# Patient Record
Sex: FEMALE | Race: WHITE | Employment: OTHER | URBAN - METROPOLITAN AREA
[De-identification: names, ages, dates, MRNs, and addresses within clinical notes are randomized per-mention and may not be internally consistent; named-entity substitution may affect disease eponyms.]

---

## 2017-08-16 ENCOUNTER — HOSPITAL ENCOUNTER (OUTPATIENT)
Age: 47
Setting detail: OBSERVATION
Discharge: HOME | DRG: 305 | End: 2017-08-18
Attending: EMERGENCY MEDICINE | Admitting: FAMILY MEDICINE
Payer: COMMERCIAL

## 2017-08-16 ENCOUNTER — APPOINTMENT (OUTPATIENT)
Dept: CT IMAGING | Age: 47
DRG: 305 | End: 2017-08-16
Attending: NURSE PRACTITIONER
Payer: COMMERCIAL

## 2017-08-16 DIAGNOSIS — R20.0 NUMBNESS OF TONGUE: ICD-10-CM

## 2017-08-16 PROBLEM — R53.1 WEAKNESS: Status: ACTIVE | Noted: 2017-08-16

## 2017-08-16 LAB
ALBUMIN SERPL-MCNC: 4.3 G/DL (ref 3.5–5)
ALBUMIN/GLOB SERPL: 1.3 {RATIO} (ref 1.1–2.2)
ALP SERPL-CCNC: 86 U/L (ref 45–117)
ALT SERPL-CCNC: 49 U/L (ref 12–78)
ANION GAP SERPL CALC-SCNC: 7 MMOL/L (ref 5–15)
AST SERPL-CCNC: 20 U/L (ref 15–37)
ATRIAL RATE: 121 BPM
BASOPHILS # BLD: 0 K/UL (ref 0–0.1)
BASOPHILS NFR BLD: 0 % (ref 0–1)
BILIRUB DIRECT SERPL-MCNC: 0.1 MG/DL (ref 0–0.2)
BILIRUB SERPL-MCNC: 0.6 MG/DL (ref 0.2–1)
BUN SERPL-MCNC: 11 MG/DL (ref 6–20)
BUN/CREAT SERPL: 16 (ref 12–20)
CALCIUM SERPL-MCNC: 9 MG/DL (ref 8.5–10.1)
CALCULATED P AXIS, ECG09: 35 DEGREES
CALCULATED R AXIS, ECG10: 61 DEGREES
CALCULATED T AXIS, ECG11: -46 DEGREES
CHLORIDE SERPL-SCNC: 96 MMOL/L (ref 97–108)
CK SERPL-CCNC: 111 U/L (ref 26–192)
CO2 SERPL-SCNC: 32 MMOL/L (ref 21–32)
CREAT SERPL-MCNC: 0.7 MG/DL (ref 0.55–1.02)
D DIMER PPP FEU-MCNC: <0.17 MG/L FEU (ref 0–0.65)
DIAGNOSIS, 93000: NORMAL
EOSINOPHIL # BLD: 0.1 K/UL (ref 0–0.4)
EOSINOPHIL NFR BLD: 2 % (ref 0–7)
ERYTHROCYTE [DISTWIDTH] IN BLOOD BY AUTOMATED COUNT: 12.3 % (ref 11.5–14.5)
FOLATE SERPL-MCNC: 24.8 NG/ML (ref 5–21)
GLOBULIN SER CALC-MCNC: 3.2 G/DL (ref 2–4)
GLUCOSE SERPL-MCNC: 135 MG/DL (ref 65–100)
HCG UR QL: NEGATIVE
HCT VFR BLD AUTO: 41.6 % (ref 35–47)
HGB BLD-MCNC: 14.1 G/DL (ref 11.5–16)
INR PPP: 1.1 (ref 0.9–1.1)
LYMPHOCYTES # BLD: 2 K/UL (ref 0.8–3.5)
LYMPHOCYTES NFR BLD: 27 % (ref 12–49)
MAGNESIUM SERPL-MCNC: 2.2 MG/DL (ref 1.6–2.4)
MCH RBC QN AUTO: 31.4 PG (ref 26–34)
MCHC RBC AUTO-ENTMCNC: 33.9 G/DL (ref 30–36.5)
MCV RBC AUTO: 92.7 FL (ref 80–99)
MONOCYTES # BLD: 0.6 K/UL (ref 0–1)
MONOCYTES NFR BLD: 8 % (ref 5–13)
NEUTS SEG # BLD: 4.8 K/UL (ref 1.8–8)
NEUTS SEG NFR BLD: 63 % (ref 32–75)
P-R INTERVAL, ECG05: 150 MS
PLATELET # BLD AUTO: 210 K/UL (ref 150–400)
POTASSIUM SERPL-SCNC: 3.1 MMOL/L (ref 3.5–5.1)
PROT SERPL-MCNC: 7.5 G/DL (ref 6.4–8.2)
PROTHROMBIN TIME: 11.2 SEC (ref 9–11.1)
Q-T INTERVAL, ECG07: 336 MS
QRS DURATION, ECG06: 88 MS
QTC CALCULATION (BEZET), ECG08: 477 MS
RBC # BLD AUTO: 4.49 M/UL (ref 3.8–5.2)
SODIUM SERPL-SCNC: 135 MMOL/L (ref 136–145)
TROPONIN I SERPL-MCNC: <0.04 NG/ML
TROPONIN I SERPL-MCNC: <0.04 NG/ML
TSH SERPL DL<=0.05 MIU/L-ACNC: 1.01 UIU/ML (ref 0.36–3.74)
VENTRICULAR RATE, ECG03: 121 BPM
VIT B12 SERPL-MCNC: 708 PG/ML (ref 211–911)
WBC # BLD AUTO: 7.6 K/UL (ref 3.6–11)

## 2017-08-16 PROCEDURE — 36415 COLL VENOUS BLD VENIPUNCTURE: CPT | Performed by: NURSE PRACTITIONER

## 2017-08-16 PROCEDURE — 85610 PROTHROMBIN TIME: CPT | Performed by: NURSE PRACTITIONER

## 2017-08-16 PROCEDURE — 82746 ASSAY OF FOLIC ACID SERUM: CPT | Performed by: FAMILY MEDICINE

## 2017-08-16 PROCEDURE — 83735 ASSAY OF MAGNESIUM: CPT | Performed by: NURSE PRACTITIONER

## 2017-08-16 PROCEDURE — 82550 ASSAY OF CK (CPK): CPT | Performed by: NURSE PRACTITIONER

## 2017-08-16 PROCEDURE — 96375 TX/PRO/DX INJ NEW DRUG ADDON: CPT

## 2017-08-16 PROCEDURE — 81025 URINE PREGNANCY TEST: CPT

## 2017-08-16 PROCEDURE — 84443 ASSAY THYROID STIM HORMONE: CPT | Performed by: NURSE PRACTITIONER

## 2017-08-16 PROCEDURE — 96361 HYDRATE IV INFUSION ADD-ON: CPT

## 2017-08-16 PROCEDURE — 80048 BASIC METABOLIC PNL TOTAL CA: CPT | Performed by: NURSE PRACTITIONER

## 2017-08-16 PROCEDURE — 85379 FIBRIN DEGRADATION QUANT: CPT | Performed by: NURSE PRACTITIONER

## 2017-08-16 PROCEDURE — 74011250637 HC RX REV CODE- 250/637: Performed by: NURSE PRACTITIONER

## 2017-08-16 PROCEDURE — 74011250636 HC RX REV CODE- 250/636: Performed by: NURSE PRACTITIONER

## 2017-08-16 PROCEDURE — 84484 ASSAY OF TROPONIN QUANT: CPT | Performed by: NURSE PRACTITIONER

## 2017-08-16 PROCEDURE — 82607 VITAMIN B-12: CPT | Performed by: FAMILY MEDICINE

## 2017-08-16 PROCEDURE — 99218 HC RM OBSERVATION: CPT

## 2017-08-16 PROCEDURE — 72125 CT NECK SPINE W/O DYE: CPT

## 2017-08-16 PROCEDURE — 65660000000 HC RM CCU STEPDOWN

## 2017-08-16 PROCEDURE — 74011250636 HC RX REV CODE- 250/636: Performed by: FAMILY MEDICINE

## 2017-08-16 PROCEDURE — 99285 EMERGENCY DEPT VISIT HI MDM: CPT

## 2017-08-16 PROCEDURE — 96374 THER/PROPH/DIAG INJ IV PUSH: CPT

## 2017-08-16 PROCEDURE — 85025 COMPLETE CBC W/AUTO DIFF WBC: CPT | Performed by: NURSE PRACTITIONER

## 2017-08-16 PROCEDURE — 96376 TX/PRO/DX INJ SAME DRUG ADON: CPT

## 2017-08-16 PROCEDURE — 71275 CT ANGIOGRAPHY CHEST: CPT

## 2017-08-16 PROCEDURE — 93005 ELECTROCARDIOGRAM TRACING: CPT

## 2017-08-16 PROCEDURE — 74011250637 HC RX REV CODE- 250/637: Performed by: FAMILY MEDICINE

## 2017-08-16 PROCEDURE — 74011636320 HC RX REV CODE- 636/320: Performed by: EMERGENCY MEDICINE

## 2017-08-16 PROCEDURE — 80076 HEPATIC FUNCTION PANEL: CPT | Performed by: NURSE PRACTITIONER

## 2017-08-16 PROCEDURE — 70450 CT HEAD/BRAIN W/O DYE: CPT

## 2017-08-16 PROCEDURE — 74011000258 HC RX REV CODE- 258: Performed by: EMERGENCY MEDICINE

## 2017-08-16 RX ORDER — DIAZEPAM 10 MG/2ML
5 INJECTION INTRAMUSCULAR EVERY 8 HOURS
Status: DISCONTINUED | OUTPATIENT
Start: 2017-08-16 | End: 2017-08-17

## 2017-08-16 RX ORDER — METHOCARBAMOL 750 MG/1
TABLET, FILM COATED ORAL 4 TIMES DAILY
COMMUNITY
End: 2017-08-16

## 2017-08-16 RX ORDER — ACETAMINOPHEN 325 MG/1
650 TABLET ORAL
Status: DISCONTINUED | OUTPATIENT
Start: 2017-08-16 | End: 2017-08-18 | Stop reason: HOSPADM

## 2017-08-16 RX ORDER — METHOCARBAMOL 500 MG/1
500 TABLET, FILM COATED ORAL 4 TIMES DAILY
COMMUNITY

## 2017-08-16 RX ORDER — ONDANSETRON 2 MG/ML
4 INJECTION INTRAMUSCULAR; INTRAVENOUS
Status: COMPLETED | OUTPATIENT
Start: 2017-08-16 | End: 2017-08-16

## 2017-08-16 RX ORDER — TRAMADOL HYDROCHLORIDE 50 MG/1
50 TABLET ORAL
COMMUNITY

## 2017-08-16 RX ORDER — OXYCODONE AND ACETAMINOPHEN 5; 325 MG/1; MG/1
1 TABLET ORAL
Status: DISCONTINUED | OUTPATIENT
Start: 2017-08-16 | End: 2017-08-18 | Stop reason: HOSPADM

## 2017-08-16 RX ORDER — DIAZEPAM 10 MG/2ML
5 INJECTION INTRAMUSCULAR ONCE
Status: COMPLETED | OUTPATIENT
Start: 2017-08-16 | End: 2017-08-16

## 2017-08-16 RX ORDER — HYDRALAZINE HYDROCHLORIDE 20 MG/ML
10 INJECTION INTRAMUSCULAR; INTRAVENOUS
Status: DISCONTINUED | OUTPATIENT
Start: 2017-08-16 | End: 2017-08-18

## 2017-08-16 RX ORDER — SODIUM CHLORIDE 0.9 % (FLUSH) 0.9 %
10 SYRINGE (ML) INJECTION
Status: COMPLETED | OUTPATIENT
Start: 2017-08-16 | End: 2017-08-16

## 2017-08-16 RX ORDER — SULFASALAZINE 500 MG/1
500 TABLET ORAL 4 TIMES DAILY
COMMUNITY

## 2017-08-16 RX ORDER — POTASSIUM CHLORIDE 750 MG/1
40 TABLET, FILM COATED, EXTENDED RELEASE ORAL
Status: COMPLETED | OUTPATIENT
Start: 2017-08-16 | End: 2017-08-16

## 2017-08-16 RX ORDER — MELATONIN
1000 DAILY
COMMUNITY

## 2017-08-16 RX ORDER — PRAVASTATIN SODIUM 40 MG/1
40 TABLET ORAL
Status: DISCONTINUED | OUTPATIENT
Start: 2017-08-16 | End: 2017-08-18 | Stop reason: HOSPADM

## 2017-08-16 RX ORDER — DICLOFENAC SODIUM 75 MG/1
75 TABLET, DELAYED RELEASE ORAL 2 TIMES DAILY
COMMUNITY

## 2017-08-16 RX ORDER — SODIUM CHLORIDE 9 MG/ML
100 INJECTION, SOLUTION INTRAVENOUS CONTINUOUS
Status: DISCONTINUED | OUTPATIENT
Start: 2017-08-16 | End: 2017-08-17

## 2017-08-16 RX ORDER — GUAIFENESIN 100 MG/5ML
81 LIQUID (ML) ORAL DAILY
Status: DISCONTINUED | OUTPATIENT
Start: 2017-08-17 | End: 2017-08-18 | Stop reason: HOSPADM

## 2017-08-16 RX ORDER — FAMOTIDINE 20 MG/1
20 TABLET, FILM COATED ORAL EVERY 12 HOURS
Status: DISCONTINUED | OUTPATIENT
Start: 2017-08-16 | End: 2017-08-18 | Stop reason: HOSPADM

## 2017-08-16 RX ORDER — SODIUM CHLORIDE 9 MG/ML
100 INJECTION, SOLUTION INTRAVENOUS ONCE
Status: COMPLETED | OUTPATIENT
Start: 2017-08-16 | End: 2017-08-16

## 2017-08-16 RX ORDER — SODIUM CHLORIDE 0.9 % (FLUSH) 0.9 %
5-10 SYRINGE (ML) INJECTION AS NEEDED
Status: DISCONTINUED | OUTPATIENT
Start: 2017-08-16 | End: 2017-08-18 | Stop reason: HOSPADM

## 2017-08-16 RX ORDER — ACETAMINOPHEN 650 MG/1
650 SUPPOSITORY RECTAL
Status: DISCONTINUED | OUTPATIENT
Start: 2017-08-16 | End: 2017-08-18 | Stop reason: HOSPADM

## 2017-08-16 RX ORDER — ONDANSETRON 2 MG/ML
4 INJECTION INTRAMUSCULAR; INTRAVENOUS
Status: DISCONTINUED | OUTPATIENT
Start: 2017-08-16 | End: 2017-08-18 | Stop reason: HOSPADM

## 2017-08-16 RX ORDER — ACETAMINOPHEN 500 MG
500 TABLET ORAL
COMMUNITY

## 2017-08-16 RX ORDER — SODIUM CHLORIDE 0.9 % (FLUSH) 0.9 %
5-10 SYRINGE (ML) INJECTION EVERY 8 HOURS
Status: DISCONTINUED | OUTPATIENT
Start: 2017-08-16 | End: 2017-08-18 | Stop reason: HOSPADM

## 2017-08-16 RX ORDER — LORAZEPAM 2 MG/ML
1 INJECTION INTRAMUSCULAR ONCE
Status: COMPLETED | OUTPATIENT
Start: 2017-08-16 | End: 2017-08-16

## 2017-08-16 RX ADMIN — ONDANSETRON 4 MG: 2 INJECTION INTRAMUSCULAR; INTRAVENOUS at 14:16

## 2017-08-16 RX ADMIN — PRAVASTATIN SODIUM 40 MG: 40 TABLET ORAL at 23:09

## 2017-08-16 RX ADMIN — ONDANSETRON 4 MG: 2 INJECTION INTRAMUSCULAR; INTRAVENOUS at 19:01

## 2017-08-16 RX ADMIN — DIAZEPAM 5 MG: 5 INJECTION, SOLUTION INTRAMUSCULAR; INTRAVENOUS at 11:41

## 2017-08-16 RX ADMIN — SODIUM CHLORIDE 100 ML/HR: 900 INJECTION, SOLUTION INTRAVENOUS at 17:07

## 2017-08-16 RX ADMIN — SODIUM CHLORIDE 100 ML/HR: 900 INJECTION, SOLUTION INTRAVENOUS at 23:12

## 2017-08-16 RX ADMIN — FAMOTIDINE 20 MG: 20 TABLET ORAL at 23:09

## 2017-08-16 RX ADMIN — Medication 10 ML: at 13:10

## 2017-08-16 RX ADMIN — ONDANSETRON 4 MG: 2 INJECTION INTRAMUSCULAR; INTRAVENOUS at 11:17

## 2017-08-16 RX ADMIN — DIAZEPAM 5 MG: 5 INJECTION, SOLUTION INTRAMUSCULAR; INTRAVENOUS at 14:16

## 2017-08-16 RX ADMIN — LORAZEPAM 1 MG: 2 INJECTION INTRAMUSCULAR; INTRAVENOUS at 11:17

## 2017-08-16 RX ADMIN — DIAZEPAM 5 MG: 5 INJECTION, SOLUTION INTRAMUSCULAR; INTRAVENOUS at 23:05

## 2017-08-16 RX ADMIN — POTASSIUM CHLORIDE 40 MEQ: 750 TABLET, FILM COATED, EXTENDED RELEASE ORAL at 12:26

## 2017-08-16 RX ADMIN — Medication 10 ML: at 22:00

## 2017-08-16 RX ADMIN — ACETAMINOPHEN 650 MG: 325 TABLET, FILM COATED ORAL at 17:06

## 2017-08-16 RX ADMIN — IOPAMIDOL 70 ML: 755 INJECTION, SOLUTION INTRAVENOUS at 13:10

## 2017-08-16 RX ADMIN — SODIUM CHLORIDE 100 ML: 900 INJECTION, SOLUTION INTRAVENOUS at 13:10

## 2017-08-16 RX ADMIN — OXYCODONE HYDROCHLORIDE AND ACETAMINOPHEN 1 TABLET: 5; 325 TABLET ORAL at 19:01

## 2017-08-16 RX ADMIN — HYDRALAZINE HYDROCHLORIDE 10 MG: 20 INJECTION INTRAMUSCULAR; INTRAVENOUS at 19:07

## 2017-08-16 RX ADMIN — SODIUM CHLORIDE 100 ML/HR: 900 INJECTION, SOLUTION INTRAVENOUS at 11:41

## 2017-08-16 RX ADMIN — Medication 10 ML: at 23:09

## 2017-08-16 NOTE — ED NOTES
Patient given warm blanket, resting on stretcher with call bell within reach. No further need at this time.

## 2017-08-16 NOTE — ED TRIAGE NOTES
Pt. complains of headache and neck pain and elevated BP. Due to have MRI on Friday. Also states \"I can't focus\".

## 2017-08-16 NOTE — H&P
[address] [facility] [place] [address]   HISTORY AND PHYSICAL       Name:  Edward Jenkins   MR#:  [id number]   :  1970   Account #:  [de-identified]        Date of Adm:  2017       CHIEF COMPLAINT: Tachycardia and tongue numbness. HISTORY OF PRESENT ILLNESS: The patient is a 79-year-old   female with past medical history of chronic neck pain, history of   degenerative disk disease, and anxiety, who presents to the hospital   with the above mentioned symptoms. The patient reports that her   symptoms started about a week back. The patient reports that she has   history of chronic neck pain, has had multiple evaluations for her   degenerative disk disease and is scheduled to have an MRI in the near   future. The patient also reports that she gets exacerbation of her neck   pain off and on. For the past 1-2 weeks, she has been noticing some   increased pain in her neck. The patient reports it is more muscular in   nature, especially when she moves her neck from side to side. The   patient also reports that she has history of psoriatic arthritis for which   she has been seeing her rheumatologist. The patient reports that about   a week back, she started experiencing more pain and discomfort,   started taking some tramadol, which was prescribed to her. She also   started taking medications containing black cohosh, which was over-  the-counter for symptoms related to hot flashes. The patient reports   that about a week back, she started noticing some numbness in her   tongue bilaterally which got her concerned. She also noticed that she   has some numbness and tingling in her bilateral upper extremities. She   attributed it to radiculopathy from her cervical disk disease. The patient   reports that this morning she woke up and she felt tachycardic.  She   took her blood pressure and it was elevated, got concerned and   decided to come to the hospital. The patient also reports that she has   been traveling a lot this summer. She denies any chest pain or any   shortness of breath or cough associated with her symptoms. The   patient reports that she also had some sense of impending doom as if   she was going to have \"something really, really bad happen to her. \"   The patient denies any other complaints or problems. In the ER, the   patient was found to be hypertensive, tachycardic, with preliminary   diagnosis of serotonin syndrome is made and the hospitalist service   was requested to admit the patient. The patient reports that she thinks   she has had photophobia in the past and had some trouble with this   and has had some trouble with noise in the past, but not having the   symptoms today. The patient reports that she has been getting a   headache and initially she thought this was secondary to atypical   migraine. The patient reports that currently she has a headache, but no   photophobia and also denies any blurry vision, sore throat, trouble   swallowing, trouble with speech. Denies any chest pain, cough, fever,   chills, abdominal pain, constipation, diarrhea, urinary symptoms, focal   or generalized neurological weakness, besides the symptoms   mentioned above, recent sick contacts, falls, injuries, hematemesis,   melena, hemoptysis, any insect or tick bites that are known to her, any   rashes or any other concerns or problems. PAST MEDICAL HISTORY: See above. HOME MEDICATIONS: Currently the patient is on:   1. Robaxin 500 mg 4 times daily. 2. Azulfidine 500 mg 4 times a day. 3. Black cohosh menopause complex p.o.   4. Voltaren as needed. 5. Tramadol 50 mg every 6 hours as needed. 6. Tylenol as needed. SOCIAL HISTORY: Denies tobacco abuse, alcohol use, or IV drug   abuse. Lives at home. REVIEW OF SYSTEMS: Were reviewed and were found to be   essentially negative except for the symptoms mentioned above. ALLERGIES: NO KNOWN DRUG ALLERGIES.     PHYSICAL EXAMINATION   VITAL SIGNS: Temperature 98, pulse 106, respiratory rate 20, blood   pressure 169/104. On arrival to the ER, blood pressure was 190/100   and pulse rate was 133, pulse oximetry was 96% on room air. GENERAL: Alert and oriented x3, awake, mildly distressed and   anxious female, appears to be stated age. HEENT: Pupils equal and reactive to light. Dry mucous membranes. Tympanic membranes clear. NECK: Supple. CHEST: Clear to auscultation bilaterally. CORONARY: S1, S2 were heard. ABDOMEN: Soft, nontender, nondistended. Bowel sounds are   physiological.   EXTREMITIES: No clubbing, no cyanosis, no edema. NEUROPSYCH: Pleasant mood and affect. Cranial nerves 2-12   grossly intact. Sensory grossly within normal limits. DTRs 2+/3-4. No   hyperreflexia was noted. No clonus was noted. Strength 5/5 into 4. SKIN: Warm. LABORATORY DATA: White count 7.6, hemoglobin 14.1, hematocrit   41.6, platelets 321. INR 1.1. Sodium 135, potassium 3.1, chloride 96,   bicarbonate 32, glucose 135, BUN 11, creatinine 0.7, calcium 9.2,   magnesium 2.2, bilirubin total 0.6, albumin 4.3, ALT 49, AST 20,   alkaline phosphatase 86. , troponin less than 0.04. TSH 1.01. INR 1.1. CT of the head without contrast shows no acute abnormality. CT of the   neck shows DJD at multiple levels and anterolisthesis of C4 on C5 and   abscess of right submandibular gland. See the full reports for details. CT of the chest shows no evidence of PE.     EKG initial showed some T-wave inversion in the lateral leads. Repeat   EKG shows resolution of those changes. ASSESSMENT AND PLAN:   1. Tongue numbness, unclear etiology, associated with headache, rule   out cerebrovascular accident. I spoke with the neurologist, Dr. Gibran Hui. Recommends MRI, MRA of the brain, which have been   ordered. Aspirin, statin, neurovascular checks. PT, OT, speech   consult. Hemoglobin A1c and cardiac enzymes.  Close monitoring and   further intervention will be per hospital course. Reassess as needed. 2. Hypertension urgency with tachycardia. Differential includes anxiety   versus serotonin surge versus other etiology. The patient does not   have any clonus or hyperreflexia and no hyperthermia, which goes   against patient being in serotonin syndrome. Start the patient on IV   hydration and Valium. We will get an echocardiogram. We will get   troponin in light of EKG changes. We will start the patient on aspirin,   statin, get cardiology consult and further intervention will be per   hospital course. The patient will be monitored on telemetry and further   intervention will be per the hospital course. 3. Persistent neck pain, most likely secondary to degenerative disk   disease. I spoke with Neurology at this point of time. There is no acute   need to get a lumbar puncture. We will provide pain control and   continue to closely monitor. 4. Hypokalemia. Replace potassium. 5. Deep venous thrombosis prophylaxis. The patient will be on   sequential compression devices.         Chela Tirado MD MM / CARIDAD   D:  08/16/2017   15:50   T:  08/16/2017   16:57   Job #:  [id number]

## 2017-08-16 NOTE — IP AVS SNAPSHOT
[address] [facility] [address] [place] [address] 
[phone] Patient: Lord Agosto MRN: [id number] [facility] Current Discharge Medication List  
  
START taking these medications Dose & Instructions Dispensing Information Comments Morning Noon Evening Bedtime  
 hydrALAZINE 10 mg tablet Commonly known as:  APRESOLINE Your last dose was: Your next dose is:    
   
   
 Dose:  10 mg Take 1 Tab by mouth four (4) times daily as needed for Other (BP>150/100). Quantity:  30 Tab Refills:  1 CONTINUE these medications which have NOT CHANGED Dose & Instructions Dispensing Information Comments Morning Noon Evening Bedtime  
 diclofenac EC 75 mg EC tablet Commonly known as:  VOLTAREN Your last dose was: Your next dose is:    
   
   
 Dose:  75 mg Take 75 mg by mouth two (2) times a day. Refills:  0  
     
   
   
   
  
 ROBAXIN 500 mg tablet Generic drug:  methocarbamol Your last dose was: Your next dose is:    
   
   
 Dose:  500 mg Take 500 mg by mouth four (4) times daily. Refills:  0  
     
   
   
   
  
 sulfaSALAzine 500 mg tablet Commonly known as:  AZULFIDINE Your last dose was: Your next dose is:    
   
   
 Dose:  500 mg Take 500 mg by mouth four (4) times daily. Refills:  0  
     
   
   
   
  
 traMADol 50 mg tablet Commonly known as:  ULTRAM  
   
Your last dose was: Your next dose is:    
   
   
 Dose:  50 mg Take 50 mg by mouth every six (6) hours as needed for Pain. Refills:  0  
     
   
   
   
  
 TYLENOL EXTRA STRENGTH 500 mg tablet Generic drug:  acetaminophen Your last dose was: Your next dose is:    
   
   
 Dose:  500 mg Take 500 mg by mouth every six (6) hours as needed for Pain. Refills:  0  
     
   
   
   
  
 VITAMIN D3 1,000 unit tablet Generic drug:  cholecalciferol Your last dose was: Your next dose is:    
   
   
 Dose:  1000 Units Take 1,000 Units by mouth daily. Refills:  0 STOP taking these medications BLACK COHOSH MENOPAUSE COMPLEX PO Where to Get Your Medications Information on where to get these meds will be given to you by the nurse or doctor. ! Ask your nurse or doctor about these medications  
  hydrALAZINE 10 mg tablet

## 2017-08-16 NOTE — PROGRESS NOTES
Admission Medication Reconciliation:    Information obtained from: patient    Significant PMH/Disease States:   Past Medical History:   Diagnosis Date    Arthritis        Chief Complaint for this Admission:    Chief Complaint   Patient presents with    Headache    Neck Pain         Allergies:  Review of patient's allergies indicates no known allergies. Prior to Admission Medications:   Prior to Admission Medications   Prescriptions Last Dose Informant Patient Reported? Taking? BL COH/TEA/GINSG/DIANE/HOP/THEAN (BLACK COHOSH MENOPAUSE COMPLEX PO)   Yes Yes   Sig: Take 1 Tab by mouth daily. Patient takes estropause which contains calcium, magnesium and black cohosh   acetaminophen (TYLENOL EXTRA STRENGTH) 500 mg tablet   Yes Yes   Sig: Take 500 mg by mouth every six (6) hours as needed for Pain. cholecalciferol (VITAMIN D3) 1,000 unit tablet   Yes Yes   Sig: Take 1,000 Units by mouth daily. diclofenac EC (VOLTAREN) 75 mg EC tablet   Yes Yes   Sig: Take 75 mg by mouth two (2) times a day. methocarbamol (ROBAXIN) 500 mg tablet   Yes Yes   Sig: Take 500 mg by mouth four (4) times daily. sulfaSALAzine (AZULFIDINE) 500 mg tablet   Yes Yes   Sig: Take 500 mg by mouth four (4) times daily. traMADol (ULTRAM) 50 mg tablet   Yes Yes   Sig: Take 50 mg by mouth every six (6) hours as needed for Pain. Facility-Administered Medications: None         Comments/Recommendations: This medication history was obtained from patient; (s)he appears to be a good historian. An RX Query is not available. Medications added: all listed above    Last doses of medication: patient took tylenol around 0200 today  Review of Allergies: not completed    Also of note: Patient also takes stelera injections for storiatic arthritis. Patient just completed a prednisone taper yesterday. Med rec was completed at request of md to identify potential interactions which could lead to serotonin syndrome.  Suggested this IS a possibility as a result of tramadol + estropause (which contains black cohosh, soy isoflavins, Andre's wort). Thank you for allowing me to participate in the care of this patient. Please contact the pharmacy () or the medication reconciliation pharmacy () with any questions.     [place], BhargavD

## 2017-08-16 NOTE — ED PROVIDER NOTES
activity: Not on file     Other Topics Concern    Not on file     Social History Narrative    No narrative on file         ALLERGIES: Review of patient's allergies indicates no known allergies. Review of Systems   Constitutional: Positive for fatigue. Negative for activity change, appetite change, chills, diaphoresis and fever. HENT: Positive for voice change (patient states her voice is more scratchy then normal.). Negative for congestion, ear pain, sinus pressure, sneezing, sore throat and trouble swallowing. Eyes: Negative for photophobia, pain, redness and visual disturbance. Respiratory: Negative for cough, chest tightness, shortness of breath and wheezing. Cardiovascular: Negative for chest pain, palpitations and leg swelling. Gastrointestinal: Positive for nausea. Negative for abdominal distention, abdominal pain and vomiting. Endocrine: Negative. Genitourinary: Negative for difficulty urinating, flank pain, frequency and urgency. Musculoskeletal: Positive for arthralgias, myalgias and neck pain (no pain on palpation to vertebral body but muscle pain endorsed). Negative for back pain, gait problem, joint swelling and neck stiffness. Skin: Negative for color change, pallor, rash and wound. psoriasis   Allergic/Immunologic: Negative. Neurological: Positive for numbness (numbness and tingling in extremities.) and headaches. Negative for dizziness, speech difficulty and weakness. States she feels \"foggy. \"   Hematological: Does not bruise/bleed easily. Psychiatric/Behavioral: Positive for decreased concentration. Negative for behavioral problems. The patient is nervous/anxious. Vitals:    08/16/17 1025 08/16/17 1100   BP: (!) 190/100 (!) 170/97   Pulse: (!) 133 (!) 115   Resp: 18 19   Temp: 98 °F (36.7 °C)    SpO2: 100% 97%   Weight: 82.1 kg (181 lb)    Height: 5' 9\" (1.753 m)             Physical Exam   Constitutional: She is oriented to person, place, and time. She appears well-developed and well-nourished. She appears distressed (moderate). HENT:   Head: Normocephalic and atraumatic. Right Ear: External ear normal.   Left Ear: External ear normal.   Nose: Nose normal.   Mouth/Throat: Oropharynx is clear and moist.   Eyes: Conjunctivae and EOM are normal. Pupils are equal, round, and reactive to light. Right eye exhibits no discharge. Left eye exhibits no discharge. Neck: Normal range of motion. Neck supple. No JVD present. No tracheal deviation present. Cardiovascular: Regular rhythm, normal heart sounds and intact distal pulses. Exam reveals no gallop. No murmur heard. Tachycardia 130's noted. Pulmonary/Chest: Effort normal and breath sounds normal. No respiratory distress. She has no wheezes. She has no rales. She exhibits no tenderness. Abdominal: Soft. Bowel sounds are normal. She exhibits no distension. There is no tenderness. There is no rebound and no guarding. Genitourinary:   Genitourinary Comments: Negative     Musculoskeletal: Normal range of motion. She exhibits no edema, tenderness or deformity. Neurological: She is alert and oriented to person, place, and time. Patient with occasional flight of ideas   Skin: Skin is warm and dry. No rash noted. No erythema. No pallor. Baseline history of psoriasis noted on extremities. Psychiatric: She has a normal mood and affect. Her behavior is normal. Judgment and thought content normal.   Anxious stating she \"feels foggy and something is very wrong. \"   Nursing note and vitals reviewed. [facility]  ED Course     1045: initial assessment of patient as documented. Noted heart rate elevated and blood pressure elevated. Pupils pinpoint and patient feeling \"foggy. \" Will monitor closely. 1145: Updated patient and family on plan of care. 1218: reviewed labs. Troponin negative, ddimer negative. Replete potassium. 1420: Spoke with Dr. Camila Scruggs regarding admission.  Patient and hospitalist in agreement with plan of care. 1444: Medicated with zofran for increased nausea. Procedures      ED EKG interpretation:  Rhythm: sinus tachycardia; and regular . Rate (approx.): 121; ST/T wave abnormality, possible lateral ischemia. Note written by Clarence Gaytan, as dictated by Anmol Espinoza NP 1:26 PM      Repeat ED EKG interpretation:  Rhythm: normal sinus rhythm; and regular . Rate (approx.): 97; Axis: normal; ST/T wave: non-specific changes.    Note written by Clarence Gaytan, as dictated by Anmol Espinoza NP 2:31 PM

## 2017-08-16 NOTE — IP AVS SNAPSHOT
[address] 
[phone] Patient: Willi Calzada MRN: [id number] Solo Eth You are allergic to the following No active allergies Recent Documentation Height Weight BMI OB Status Smoking Status 1.753 m 80.3 kg 26.14 kg/m2 Ablation Never Smoker Emergency Contacts Name Discharge Info Relation Home Work Mobile 49 [address] Moya CAREGIVER [3] Spouse [3] [phone] About your hospitalization You were admitted on:  August 16, 2017 You last received care in the:  [facility] 4 IM 2 You were discharged on:  August 18, 2017 Unit phone number:  [phone] Why you were hospitalized Your primary diagnosis was:  Weakness Providers Seen During Your Hospitalizations Provider Role Specialty Primary office phone Wade Ramirez MD Attending Provider Emergency Medicine [phone] David Winslow MD Attending Provider Hospitalist [phone] Sally Castillo MD Attending Provider Internal Medicine [phone] Your Primary Care Physician (PCP) Primary Care Physician Office Phone Office Fax [address] [phone] Follow-up Information Follow up With Details Comments Contact Info Michael Vasquez MD Schedule an appointment as soon as possible for a visit As needed [address] [place] [address] 
[phone] Caitlyn Laughlin MD  As needed [facility] [address] [facility] [address] [phone] Current Discharge Medication List  
  
START taking these medications Dose & Instructions Dispensing Information Comments Morning Noon Evening Bedtime  
 hydrALAZINE 10 mg tablet Commonly known as:  APRESOLINE Your last dose was:     
   
Your next dose is:    
   
   
 Dose:  10 mg  
 Take 1 Tab by mouth four (4) times daily as needed for Other (BP>150/100). Quantity:  30 Tab Refills:  1 CONTINUE these medications which have NOT CHANGED Dose & Instructions Dispensing Information Comments Morning Noon Evening Bedtime  
 diclofenac EC 75 mg EC tablet Commonly known as:  VOLTAREN Your last dose was: Your next dose is:    
   
   
 Dose:  75 mg Take 75 mg by mouth two (2) times a day. Refills:  0  
     
   
   
   
  
 ROBAXIN 500 mg tablet Generic drug:  methocarbamol Your last dose was: Your next dose is:    
   
   
 Dose:  500 mg Take 500 mg by mouth four (4) times daily. Refills:  0  
     
   
   
   
  
 sulfaSALAzine 500 mg tablet Commonly known as:  AZULFIDINE Your last dose was: Your next dose is:    
   
   
 Dose:  500 mg Take 500 mg by mouth four (4) times daily. Refills:  0  
     
   
   
   
  
 traMADol 50 mg tablet Commonly known as:  ULTRAM  
   
Your last dose was: Your next dose is:    
   
   
 Dose:  50 mg Take 50 mg by mouth every six (6) hours as needed for Pain. Refills:  0  
     
   
   
   
  
 TYLENOL EXTRA STRENGTH 500 mg tablet Generic drug:  acetaminophen Your last dose was: Your next dose is:    
   
   
 Dose:  500 mg Take 500 mg by mouth every six (6) hours as needed for Pain. Refills:  0  
     
   
   
   
  
 VITAMIN D3 1,000 unit tablet Generic drug:  cholecalciferol Your last dose was: Your next dose is:    
   
   
 Dose:  1000 Units Take 1,000 Units by mouth daily. Refills:  0 STOP taking these medications BLACK COHOSH MENOPAUSE COMPLEX PO Where to Get Your Medications Information on where to get these meds will be given to you by the nurse or doctor. ! Ask your nurse or doctor about these medications  
  hydrALAZINE 10 mg tablet Discharge Instructions Discharge Instructions PATIENT ID: Briscoe Babinski MRN: [id number] YOB: 1970 DATE OF ADMISSION: 8/16/2017 10:27 AM   
DATE OF DISCHARGE: 8/18/2017 PRIMARY CARE PROVIDER: Camila Buerger, MD  
 
 
DISCHARGING PHYSICIAN: Negro Jimenes NP To contact this individual call [phone] and ask the  to page. If unavailable ask to be transferred the Adult Hospitalist Department. DISCHARGE DIAGNOSES Tongue numbness and Bilateral Upper Extremity Weakness CONSULTATIONS: IP CONSULT TO HOSPITALIST 
IP CONSULT TO NEUROLOGY 
IP CONSULT TO NEUROLOGY 
IP CONSULT TO CARDIOLOGY PROCEDURES/SURGERIES: * No surgery found * PENDING TEST RESULTS:  
At the time of discharge the following test results are still pending: na 
 
FOLLOW UP APPOINTMENTS:  
Follow-up Information Follow up With Details Comments Contact Info Camila Buerger, MD Schedule an appointment as soon as possible for a visit As needed [address] [place], [place] [phone] Bubba Adame MD  As needed [place] [address] 
[phone] ADDITIONAL CARE RECOMMENDATIONS: Follow up with your PCP Repeat Lipid Panel fasting Follow up with your blood pressures DIET: low cholesterol, low fat ACTIVITY: resume DISCHARGE MEDICATIONS: 
 See Medication Reconciliation Form · It is important that you take the medication exactly as they are prescribed. · Keep your medication in the bottles provided by the pharmacist and keep a list of the medication names, dosages, and times to be taken in your wallet. · Do not take other medications without consulting your doctor. NOTIFY YOUR PHYSICIAN FOR ANY OF THE FOLLOWING:  
Fever over 101 degrees for 24 hours.   
Chest pain, shortness of breath, fever, chills, nausea, vomiting, diarrhea, change in mentation, falling, weakness, bleeding. Severe pain or pain not relieved by medications. Or, any other signs or symptoms that you may have questions about. DISPOSITION: 
  Home With: 
 OT  PT  HH  RN  
  
 SNF/Inpatient Rehab/LTAC  
x Independent/assisted living Hospice Other: CDMP Checked:  
Yes x Signed:  
Lissy Moreno NP 
8/18/2017 
8:24 AM 
 
Discharge Orders None [facility] Announcement We are excited to announce that we are making your provider's discharge notes available to you in Fanmode. You will see these notes when they are completed and signed by the physician that discharged you from your recent hospital stay. If you have any questions or concerns about any information you see in Fanmode, please call the Health Information Department where you were seen or reach out to your Primary Care Provider for more information about your plan of care. Introducing [facility] HE[facility]! [facility] introduces Fanmode patient portal. Now you can access parts of your medical record, email your doctor's office, and request medication refills online. 1. In your internet browser, go to [email or web address] 2. Click on the First Time User? Click Here link in the Sign In box. You will see the New Member Sign Up page. 3. Enter your Fanmode Access Code exactly as it appears below. You will not need to use this code after youve completed the sign-up process. If you do not sign up before the expiration date, you must request a new code. · Fanmode Access Code: [email or web address] Expires: 11/13/2017  4:38 PM 
 
4. Enter the last four digits of your Social Security Number (xxxx) and Date of Birth (mm/dd/yyyy) as indicated and click Submit. You will be taken to the next sign-up page. 5. Create a Fanmode ID. This will be your Fanmode login ID and cannot be changed, so think of one that is secure and easy to remember. 6. Create a Tocagen password. You can change your password at any time. 7. Enter your Password Reset Question and Answer. This can be used at a later time if you forget your password. 8. Enter your e-mail address. You will receive e-mail notification when new information is available in [address] 9. Click Sign Up. You can now view and download portions of your medical record. 10. Click the Download Summary menu link to download a portable copy of your medical information. If you have questions, please visit the Frequently Asked Questions section of the Tocagen website. Remember, Tocagen is NOT to be used for urgent needs. For medical emergencies, dial 911. Now available from your iPhone and Android! General Information Please provide this summary of care documentation to your next provider. Patient Signature:  ____________________________________________________________ Date:  ____________________________________________________________  
  
[facility] Provider Signature:  ____________________________________________________________ Date:  ____________________________________________________________

## 2017-08-16 NOTE — ROUTINE PROCESS
TRANSFER - OUT REPORT:    Verbal report given to Davin Tomas RN(name) on Angela Sawyer  being transferred to [facility](unit) for routine progression of care       Report consisted of patients Situation, Background, Assessment and   Recommendations(SBAR). Information from the following report(s) SBAR and ED Summary was reviewed with the receiving nurse. Lines:   Peripheral IV 08/16/17 Left Antecubital (Active)   Site Assessment Clean, dry, & intact 8/16/2017 11:16 AM   Phlebitis Assessment 0 8/16/2017 11:16 AM   Infiltration Assessment 0 8/16/2017 11:16 AM   Dressing Status Clean, dry, & intact 8/16/2017 11:16 AM   Dressing Type Transparent 8/16/2017 11:16 AM   Hub Color/Line Status Pink;Flushed;Patent 8/16/2017 11:16 AM   Action Taken Blood drawn 8/16/2017 11:16 AM        Opportunity for questions and clarification was provided.

## 2017-08-17 ENCOUNTER — APPOINTMENT (OUTPATIENT)
Dept: MRI IMAGING | Age: 47
DRG: 305 | End: 2017-08-17
Attending: FAMILY MEDICINE
Payer: COMMERCIAL

## 2017-08-17 LAB
ANION GAP SERPL CALC-SCNC: 7 MMOL/L (ref 5–15)
ATRIAL RATE: 97 BPM
BUN SERPL-MCNC: 6 MG/DL (ref 6–20)
BUN/CREAT SERPL: 9 (ref 12–20)
CALCIUM SERPL-MCNC: 8.9 MG/DL (ref 8.5–10.1)
CALCULATED P AXIS, ECG09: 14 DEGREES
CALCULATED R AXIS, ECG10: 58 DEGREES
CALCULATED T AXIS, ECG11: 8 DEGREES
CHLORIDE SERPL-SCNC: 104 MMOL/L (ref 97–108)
CHOLEST SERPL-MCNC: 209 MG/DL
CO2 SERPL-SCNC: 28 MMOL/L (ref 21–32)
CREAT SERPL-MCNC: 0.64 MG/DL (ref 0.55–1.02)
DIAGNOSIS, 93000: NORMAL
ERYTHROCYTE [DISTWIDTH] IN BLOOD BY AUTOMATED COUNT: 12.6 % (ref 11.5–14.5)
EST. AVERAGE GLUCOSE BLD GHB EST-MCNC: 117 MG/DL
GLUCOSE BLD STRIP.AUTO-MCNC: 100 MG/DL (ref 65–100)
GLUCOSE BLD STRIP.AUTO-MCNC: 108 MG/DL (ref 65–100)
GLUCOSE BLD STRIP.AUTO-MCNC: 124 MG/DL (ref 65–100)
GLUCOSE SERPL-MCNC: 88 MG/DL (ref 65–100)
HBA1C MFR BLD: 5.7 % (ref 4.2–6.3)
HCT VFR BLD AUTO: 41.8 % (ref 35–47)
HDLC SERPL-MCNC: 89 MG/DL
HDLC SERPL: 2.3 {RATIO} (ref 0–5)
HGB BLD-MCNC: 13.9 G/DL (ref 11.5–16)
LDLC SERPL CALC-MCNC: 108.8 MG/DL (ref 0–100)
LIPID PROFILE,FLP: ABNORMAL
MCH RBC QN AUTO: 31.1 PG (ref 26–34)
MCHC RBC AUTO-ENTMCNC: 33.3 G/DL (ref 30–36.5)
MCV RBC AUTO: 93.5 FL (ref 80–99)
P-R INTERVAL, ECG05: 138 MS
PLATELET # BLD AUTO: 222 K/UL (ref 150–400)
POTASSIUM SERPL-SCNC: 3.9 MMOL/L (ref 3.5–5.1)
Q-T INTERVAL, ECG07: 374 MS
QRS DURATION, ECG06: 86 MS
QTC CALCULATION (BEZET), ECG08: 474 MS
RBC # BLD AUTO: 4.47 M/UL (ref 3.8–5.2)
SERVICE CMNT-IMP: ABNORMAL
SERVICE CMNT-IMP: ABNORMAL
SERVICE CMNT-IMP: NORMAL
SODIUM SERPL-SCNC: 139 MMOL/L (ref 136–145)
TRIGL SERPL-MCNC: 56 MG/DL (ref ?–150)
TROPONIN I SERPL-MCNC: <0.04 NG/ML
TROPONIN I SERPL-MCNC: <0.04 NG/ML
VENTRICULAR RATE, ECG03: 97 BPM
VLDLC SERPL CALC-MCNC: 11.2 MG/DL
WBC # BLD AUTO: 7.9 K/UL (ref 3.6–11)

## 2017-08-17 PROCEDURE — 74011250637 HC RX REV CODE- 250/637: Performed by: FAMILY MEDICINE

## 2017-08-17 PROCEDURE — 74011250637 HC RX REV CODE- 250/637: Performed by: NURSE PRACTITIONER

## 2017-08-17 PROCEDURE — A9577 INJ MULTIHANCE: HCPCS | Performed by: HOSPITALIST

## 2017-08-17 PROCEDURE — 96376 TX/PRO/DX INJ SAME DRUG ADON: CPT

## 2017-08-17 PROCEDURE — 93306 TTE W/DOPPLER COMPLETE: CPT

## 2017-08-17 PROCEDURE — 36415 COLL VENOUS BLD VENIPUNCTURE: CPT | Performed by: FAMILY MEDICINE

## 2017-08-17 PROCEDURE — 99218 HC RM OBSERVATION: CPT

## 2017-08-17 PROCEDURE — 74011250636 HC RX REV CODE- 250/636: Performed by: HOSPITALIST

## 2017-08-17 PROCEDURE — 74011250636 HC RX REV CODE- 250/636: Performed by: FAMILY MEDICINE

## 2017-08-17 PROCEDURE — 70548 MR ANGIOGRAPHY NECK W/DYE: CPT

## 2017-08-17 PROCEDURE — 84484 ASSAY OF TROPONIN QUANT: CPT | Performed by: FAMILY MEDICINE

## 2017-08-17 PROCEDURE — 77030021566 MRI BRAIN W WO CONT

## 2017-08-17 PROCEDURE — 80048 BASIC METABOLIC PNL TOTAL CA: CPT | Performed by: FAMILY MEDICINE

## 2017-08-17 PROCEDURE — 70544 MR ANGIOGRAPHY HEAD W/O DYE: CPT

## 2017-08-17 PROCEDURE — 80061 LIPID PANEL: CPT | Performed by: FAMILY MEDICINE

## 2017-08-17 PROCEDURE — 74011000258 HC RX REV CODE- 258: Performed by: HOSPITALIST

## 2017-08-17 PROCEDURE — 65660000000 HC RM CCU STEPDOWN

## 2017-08-17 PROCEDURE — 83036 HEMOGLOBIN GLYCOSYLATED A1C: CPT | Performed by: FAMILY MEDICINE

## 2017-08-17 PROCEDURE — 92610 EVALUATE SWALLOWING FUNCTION: CPT | Performed by: SPEECH-LANGUAGE PATHOLOGIST

## 2017-08-17 PROCEDURE — 85027 COMPLETE CBC AUTOMATED: CPT | Performed by: FAMILY MEDICINE

## 2017-08-17 PROCEDURE — 82962 GLUCOSE BLOOD TEST: CPT

## 2017-08-17 RX ORDER — DIAZEPAM 2 MG/1
2 TABLET ORAL
Status: DISCONTINUED | OUTPATIENT
Start: 2017-08-17 | End: 2017-08-18 | Stop reason: HOSPADM

## 2017-08-17 RX ORDER — BUTALBITAL, ACETAMINOPHEN AND CAFFEINE 50; 325; 40 MG/1; MG/1; MG/1
1 TABLET ORAL
Status: DISCONTINUED | OUTPATIENT
Start: 2017-08-17 | End: 2017-08-18 | Stop reason: HOSPADM

## 2017-08-17 RX ADMIN — ACETAMINOPHEN 650 MG: 325 TABLET, FILM COATED ORAL at 08:28

## 2017-08-17 RX ADMIN — DIAZEPAM 5 MG: 5 INJECTION, SOLUTION INTRAMUSCULAR; INTRAVENOUS at 15:12

## 2017-08-17 RX ADMIN — HYDRALAZINE HYDROCHLORIDE 10 MG: 20 INJECTION INTRAMUSCULAR; INTRAVENOUS at 10:04

## 2017-08-17 RX ADMIN — ASPIRIN 81 MG 81 MG: 81 TABLET ORAL at 08:28

## 2017-08-17 RX ADMIN — FAMOTIDINE 20 MG: 20 TABLET ORAL at 08:28

## 2017-08-17 RX ADMIN — Medication 10 ML: at 21:35

## 2017-08-17 RX ADMIN — ONDANSETRON 4 MG: 2 INJECTION INTRAMUSCULAR; INTRAVENOUS at 03:48

## 2017-08-17 RX ADMIN — DIAZEPAM 5 MG: 5 INJECTION, SOLUTION INTRAMUSCULAR; INTRAVENOUS at 06:51

## 2017-08-17 RX ADMIN — OXYCODONE HYDROCHLORIDE AND ACETAMINOPHEN 1 TABLET: 5; 325 TABLET ORAL at 03:48

## 2017-08-17 RX ADMIN — SODIUM CHLORIDE 100 ML: 900 INJECTION, SOLUTION INTRAVENOUS at 15:00

## 2017-08-17 RX ADMIN — GADOBENATE DIMEGLUMINE 16 ML: 529 INJECTION, SOLUTION INTRAVENOUS at 15:00

## 2017-08-17 RX ADMIN — SODIUM CHLORIDE 100 ML/HR: 900 INJECTION, SOLUTION INTRAVENOUS at 10:48

## 2017-08-17 RX ADMIN — PRAVASTATIN SODIUM 40 MG: 40 TABLET ORAL at 21:34

## 2017-08-17 RX ADMIN — BUTALBITAL, ACETAMINOPHEN AND CAFFEINE 1 TABLET: 50; 325; 40 TABLET ORAL at 11:21

## 2017-08-17 RX ADMIN — FAMOTIDINE 20 MG: 20 TABLET ORAL at 21:34

## 2017-08-17 RX ADMIN — ACETAMINOPHEN 650 MG: 325 TABLET, FILM COATED ORAL at 15:12

## 2017-08-17 NOTE — PROGRESS NOTES
Problem: Pain - Acute  Goal: *Control of acute pain  Outcome: Progressing Towards Goal  Patients pain is relieved with administration of pain medication. Reassessments performed. Patient will alert nurse if pain is not resolved.

## 2017-08-17 NOTE — PROGRESS NOTES
Bedside, Verbal and Written shift change report given to Rafael Horvath (oncoming nurse) by Yoselyn Erazo (offgoing nurse). Report included the following information SBAR, Kardex, [place] and Med Rec Status   Yoselyn Halo.

## 2017-08-17 NOTE — CONSULTS
Cardiology Consult Note    Full note to be dictated. Pt seen and examined. Imp:  Abrupt onset HTN and tachycardia-?serotonin syndrome? Abnormal EKG  Cervical disc disease  Tongue numbness  Lumbar disc disease  Psoriatic arthritis    Rec:  Discontinue her outpatient drug cocktail, though she has taken most of this previously (all but the robaxin)  MRI of head and neck pending. PRN hydralazine for now; beta blocker may be better for both HTN and increased HR but waiting to see if this resolves. If she does not show improvement, would do 24 hour urine for VMA and metanephrines. Disc:  Given her previous good health, it would appear that her hypertension and tachycardia are secondary and not the primary problem. She has no cardiac history, exercises regularly, and exam and echo are unremarkable. Her ST changes with tachycardia may be normal for her. Would do outpatient stress once this has resolved.      Kash Velazquez MD

## 2017-08-17 NOTE — PROGRESS NOTES
Occupational Therapy Note  08/17/17  Ot orders received and acknowledged. Pt cleared for therapy by RN. Pt received in bed and agreeable to OT eval. Pt reporting she has been up ad-getachew and to/from bathroom with no assistance. Pt reporting no changes in vision, sensation, strength, or balance. Formal OT eval is not indicated at this time. Will complete orders.    Thanks,  [employer]

## 2017-08-17 NOTE — PROGRESS NOTES
Primary Nurse Lakesha Corrales RN and Mary Arrieta RN performed a dual skin assessment on this patient No impairment noted

## 2017-08-17 NOTE — CONSULTS
[facility] [address]   [address] [facility]       Name:  Kyra Marino   MR#:  [id number]   :  1970   Account #:  [de-identified]    Date of Consultation:  2017   Date of Adm:  2017       REQUESTING PHYSICIAN: Dr. Brock Lemon. REASON FOR EVALUATION: Tongue numbness. HISTORY OF PRESENT ILLNESS: The patient is a 44-year-old   female with a history of chronic neck pain, degenerative disk disease,   who states that for about a week or so she has had exacerbation of   neck pain and also has been having numbness of her tongue and   palate that comes and goes, especially when she gets up and starts to   walk. She reports that she has been taking Tramadol and muscle   relaxant along with herbal preparation containing black cohosh to treat   hot flashes. She is wondering if this could be related to causing   serotonergic surge which could potentially cause symptoms that she   has been experiencing. She also has had some sense of impending   doom as per admission history. She was found to be hypertensive and   tachycardic in the emergency department with a possible diagnosis of   serotonin syndrome; however, her blood pressure and heart rate is   now stable. She denies any anxiety as such and has been traveling   this summer without any issues. No significant headache at this time. No changes in visual acuity. She has been having some blurry vision   intermittently and a foggy mind. No focal motor sensory deficits in the   extremities. PAST MEDICAL HISTORY: As mentioned above. HOME MEDICATIONS   1. Robaxin. 2. Azulfidine. 3. Voltaren. 4. Tramadol. SOCIAL HISTORY: No smoking or alcohol use. She is a retired   physician. ALLERGIES: NONE. PHYSICAL EXAMINATION   GENERAL: The patient is alert and fully oriented. VITAL SIGNS: Blood pressure 140/88, temperature 97.8, pulse is 91. NEUROLOGIC: Speech is clear.  Comprehension is normal. Pupils are   equal, round, reactive. Extraocular movements are full. Face is   symmetric. Tongue is midline. Hearing is baseline. Muscle tone and   bulk is normal. Strength is normal in both upper and lower extremities. Deep tendon reflexes are 2/2 and symmetric. Toes are downgoing. Sensation is intact. Gait is baseline. HEART: Regular rate. CHEST: Clear. ABDOMEN: Soft, nontender, positive bowel sounds. EXTREMITIES: No edema. LABORATORY DATA: CBC is normal. Chemistries normal. Lipid   profile: Triglycerides 56, HDL 89, . IMAGING: MRI scan of the brain and MRA of the head and neck is   normal.     Echocardiogram shows an ejection fraction of 60%. ASSESSMENT AND PLAN: A 40-year-old female who is admitted for   multiple symptoms including intermittent tongue and palate numbness. The exact cause of this is unclear. MRI is negative for any acute   infarction or vertebrobasilar insufficiency. This could be a metabolic   disturbance due to medication side effect or drug-drug interaction. I   reassured her that it should subside/resolve on its own. I also   discussed that there are no lesions that would suggest a diagnosis   such as multiple sclerosis. She is concerned about her cervical spine   disk disease and will follow this up with an MRI of the cervical spine. Please feel free to contact me with any further questions. Thank you   for this consultation.         Roma Roche MD      AS / Gumaro Noonan   D:  08/17/2017   16:40   T:  08/17/2017   18:55   Job #:  [id number]

## 2017-08-17 NOTE — PROGRESS NOTES
Hospitalist Progress Note  Audi Jorgensen NP  Office: [phone]  Cell: [phone]      Date of Service:  2017  NAME:  Naomi Chris  :  1970  MRN:  [id number]      Admission Summary:   Vidhya is a 49year old female who presented to the emergency room after having symptoms of tongue numbness, bilateral upper arm weakness and elevated blood pressure. She has known cervical disc disease and had been seen by Dr. Brenna Restrepo in , who has since retired. She has undergone radioablation in her lumbar spine with Dr. Anastasia Rosales in the past. She exercises daily and feels that she is experiencing some dizziness, headache, but denies palpitations or chest pain. She was admitted to further work up her symptoms. Interval history / Subjective:     She is concerned that she may have MS based on her symptoms. We discussed that she would undergo imaging of MRI, echo, and be evaluated by Neurology. She still has a headache this AM but no other symptoms of numbness on my exam.     Assessment & Plan:     Tongue numbness, Unlikely TIA, concern for Serotonin Syndrome  Hgb A1C 5.7  Elevated LDL and Cholesterol but nonfasting  TSH 1.01  Obtain MRI/MRA today as well as echo  Neurology consult  ASA, statin  Head and CT Spine:1.3 mm anterolisthesis of C4 on C5 noted. There is no fracture. The odontoid  process is normal. The craniocervical junction is within normal limits. Paraspinal soft tissues are unremarkable. 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     Hypertensive Urgency with tachycardia  Likely due to anxiety  This has resolved this AM  Cards consult, may need BB, will need outpt stress test    Degenerative Disc Disease/Psoriatic Arthritis  Scheduled Valium    Hypokalemia  resolved    Code status: Full  DVT prophylaxis: 61513 Ara French discussed with: Patient/Family and Nurse  Disposition: TBD     Hospital Problems  Date Reviewed: 8/17/2017          Codes Class Noted POA    Weakness ICD-10-CM: R53.1  ICD-9-CM: 780.79  8/16/2017 Unknown                Review of Systems:   A comprehensive review of systems was negative except for that written in the HPI. Vital Signs:    Last 24hrs VS reviewed since prior progress note. Most recent are:  Visit Vitals    /88 (BP 1 Location: Right arm, BP Patient Position: At rest)    Pulse 85    Temp 97.9 °F (36.6 °C)    Resp 17    Ht 5' 9\" (1.753 m)    Wt 80.2 kg (176 lb 12.9 oz)    SpO2 95%    BMI 26.11 kg/m2         Intake/Output Summary (Last 24 hours) at 08/17/17 9072  Last data filed at 08/16/17 2314   Gross per 24 hour   Intake                0 ml   Output                1 ml   Net               -1 ml        Physical Examination:             Constitutional:  No acute distress, cooperative, pleasant    ENT:  Oral mucous moist, oropharynx benign. Neck supple,    Resp:  CTA bilaterally. No wheezing/rhonchi/rales. No accessory muscle use   CV:  Regular rhythm, normal rate, no murmurs, gallops, rubs    GI:  Soft, non distended, non tender. normoactive bowel sounds, no hepatosplenomegaly     Musculoskeletal:  No edema, warm, 2+ pulses throughout    Neurologic:  Moves all extremities. AAOx3, CN II-XII reviewed, no facial droop , face is symmetrical, tongue symmetrical, EOMS intact     Psych:  Good insight, Not anxious nor agitated.   Skin:  Good turgor, no rashes or ulcers       Data Review:    Review and/or order of clinical lab test  Review and/or order of tests in the radiology section of CPT      Labs:     Recent Labs      08/17/17   0325  08/16/17   1113   WBC  7.9  7.6   HGB  13.9  14.1   HCT  41.8  41.6   PLT  222  210     Recent Labs      08/17/17   0325  08/16/17   1113   NA  139  135*   K  3.9  3.1*   CL  104  96*   CO2  28  32   BUN  6  11   CREA  0.64  0.70   GLU  88  135*   CA  8.9  9.0   MG   --   2.2     Recent Labs      08/16/17   1113   SGOT  20   ALT  49   AP  86 TBILI  0.6   TP  7.5   ALB  4.3   GLOB  3.2     Recent Labs      08/16/17   1137   INR  1.1   PTP  11.2*      No results for input(s): FE, TIBC, PSAT, FERR in the last 72 hours. Lab Results   Component Value Date/Time    Folate 24.8 08/16/2017 11:13 AM      No results for input(s): PH, PCO2, PO2 in the last 72 hours.   Recent Labs      08/17/17   0325  08/16/17   1825  08/16/17   1113   CPK   --    --   111   TROIQ  <0.04  <0.04  <0.04     Lab Results   Component Value Date/Time    Cholesterol, total 209 08/17/2017 03:25 AM    HDL Cholesterol 89 08/17/2017 03:25 AM    LDL, calculated 108.8 08/17/2017 03:25 AM    Triglyceride 56 08/17/2017 03:25 AM    CHOL/HDL Ratio 2.3 08/17/2017 03:25 AM     No results found for: GLUCPOC  No results found for: COLOR, APPRN, SPGRU, REFSG, DENIZ, PROTU, GLUCU, KETU, BILU, UROU, JOSE ENRIQUE, LEUKU, GLUKE, EPSU, BACTU, WBCU, RBCU, CASTS, UCRY      Medications Reviewed:     Current Facility-Administered Medications   Medication Dose Route Frequency    butalbital-acetaminophen-caffeine (FIORICET, ESGIC) -40 mg per tablet 1 Tab  1 Tab Oral Q6H PRN    sodium chloride (NS) flush 5-10 mL  5-10 mL IntraVENous Q8H    sodium chloride (NS) flush 5-10 mL  5-10 mL IntraVENous PRN    acetaminophen (TYLENOL) tablet 650 mg  650 mg Oral Q4H PRN    Or    acetaminophen (TYLENOL) solution 650 mg  650 mg Per NG tube Q4H PRN    Or    acetaminophen (TYLENOL) suppository 650 mg  650 mg Rectal Q4H PRN    0.9% sodium chloride infusion  100 mL/hr IntraVENous CONTINUOUS    ondansetron (ZOFRAN) injection 4 mg  4 mg IntraVENous Q6H PRN    aspirin chewable tablet 81 mg  81 mg Oral DAILY    pravastatin (PRAVACHOL) tablet 40 mg  40 mg Oral QHS    famotidine (PEPCID) tablet 20 mg  20 mg Oral Q12H    diazePAM (VALIUM) injection 5 mg  5 mg IntraVENous Q8H    oxyCODONE-acetaminophen (PERCOCET) 5-325 mg per tablet 1 Tab  1 Tab Oral Q6H PRN    hydrALAZINE (APRESOLINE) 20 mg/mL injection 10 mg  10 mg IntraVENous Q6H PRN    acetaminophen (TYLENOL) tablet 650 mg  650 mg Oral Q4H PRN     ______________________________________________________________________  EXPECTED LENGTH OF STAY: - - -  ACTUAL LENGTH OF STAY:          [place] Angelia Dance, NP

## 2017-08-17 NOTE — PROGRESS NOTES
Speech Pathology bedside swallow evaluation/discharge  Patient: Tiara Ramsey (53 y.o. female)  Date: 2017  Primary Diagnosis: Weakness        Precautions: fall       ASSESSMENT :  Based on the objective data described below, the patient presents with no oral or pharyngeal dysphagia. Strong oral motor strength and function, timely and complete mastication, timely swallow initiation and functional hyolaryngeal elevation and excursion via palpation. No s/s of aspiration observed. Patient reports occasional tongue numbness recently. Reports lasts for variable times and no specific exacerbating or alleviating factors. No c/o choking or coughing with intake during periods of numbness. Skilled therapy provided by a speech-language pathologist is not indicated at this time. PLAN :  Recommendations:  --continue regular diet. No further SLP needs at this time. Discharge Recommendations: None     SUBJECTIVE:   Patient alert, cooperative, agreeable to evaluation. Reports occasional tongue numbness recently; questions if related to nerve compression in neck. OBJECTIVE:     Past Medical History:   Diagnosis Date    Arthritis      Past Surgical History:   Procedure Laterality Date    HX GYN      ablation    HX ORTHOPAEDIC           Prior Level of Function/Home Situation:      Diet prior to admission: regular   Current Diet:  Regular    Cognitive and Communication Status:  Neurologic State: Alert, Appropriate for age  Orientation Level: Oriented X4  Cognition: Appropriate decision making, Appropriate for age attention/concentration, Appropriate safety awareness  Perception: Appears intact  Perseveration: No perseveration noted  Safety/Judgement: Awareness of environment, Insight into deficits  Oral Assessment:  Oral Assessment  Labial: No impairment  Dentition: Natural  Oral Hygiene: moist mucosa   Lingual: No impairment  Velum: No impairment  Mandible: No impairment  P.O.  Trials:  Patient Position: upright in bed   Vocal quality prior to P.O.: No impairment  Consistency Presented: Thin liquid;Puree; Solid  How Presented: Self-fed/presented;Cup/sip;Cup/gulp;Straw;Successive swallows;Spoon     Bolus Acceptance: No impairment  Bolus Formation/Control: No impairment     Propulsion: No impairment  Oral Residue: None  Initiation of Swallow: No impairment  Laryngeal Elevation: Functional  Aspiration Signs/Symptoms: None  Pharyngeal Phase Characteristics: No impairment, issues, or problems         Comments: pt complains of intermittent tongue numbness. no exacerbating or relieving factors     Oral Phase Severity: No impairment  Pharyngeal Phase Severity : No impairment    G Codes: In compliance with CMSs Claims Based Outcome Reporting, the following G-code set was chosen for this patient based the use of the NOMS functional outcome to quantify this patient's level of swallowing impairment. Using the NOMS, the patient was determined to be at level 7 for swallow function which correlates with the CH= 0% level of severity. Based on the objective assessment provided within this note, the current, goal, and discharge g-codes are as follows:    Swallow  Swallowing:   Swallow Current Status CH= 0%   Swallow Goal Status CH= 0%   Swallow D/C Status CH= 0%        NOMS Swallowing Levels:  Level 1 (CN): NPO  Level 2 (CM): NPO but takes consistency in therapy  Level 3 (CL): Takes less than 50% of nutrition p.o. and continues with nonoral feedings; and/or safe with mod cues; and/or max diet restriction  Level 4 (CK): Safe swallow but needs mod cues; and/or mod diet restriction; and/or still requires some nonoral feeding/supplements  Level 5 (CJ): Safe swallow with min diet restriction; and/or needs min cues  Level 6 (CI): Independent with p.o.; rare cues; usually self cues; may need to avoid some foods or needs extra time  Level 7 ([address] [place] [address]): Independent for all p.o.  AD. (2003).  National Outcomes Measurement System (NOMS): Adult Speech-Language Pathology User's Guide. Pain:Pain Scale 1: Numeric (0 - 10)  Pain Intensity 1: 3  Pain Location 1: Head  After treatment:   [] Patient left in no apparent distress sitting up in chair  [x] Patient left in no apparent distress in bed  [x] Call bell left within reach  [x] Nursing notified  [] Caregiver present  [] Bed alarm activated    COMMUNICATION/EDUCATION:   The patients plan of care including findings, recommendations, and recommended diet changes were discussed with: Registered Nurse. [x] Patient/family have participated as able and agree with findings and recommendations. [] Patient is unable to participate in plan of care at this time. Thank you for this referral.  Cleda Bosworth, M.CD.  CCC-SLP   Time Calculation: 11 mins

## 2017-08-17 NOTE — CONSULTS
Consult dictated. Intermittent tongue numbness. MRI negative to my review. ?metabolic disturbance/medication side effect. Reassurance provided. F/U MRI c spine.   Maricruz Hamilton MD

## 2017-08-17 NOTE — PROGRESS NOTES
Consult received, chart reviewed, pt this am has elevated /109 and  and then /98. PT will hold and see when VSS.  Estee Mcneill, PT

## 2017-08-17 NOTE — PROGRESS NOTES
Consult received, chart reviewed, case discussed with pt's nurse who reports that pt has been up ad getachew and has no noted balance/mobility deficits and can be removed rehab log for PT services. Please re consult for PT should there be a change in status that requires any PT.  Thank you, Bridgett Alonso, PT

## 2017-08-18 ENCOUNTER — APPOINTMENT (OUTPATIENT)
Dept: MRI IMAGING | Age: 47
DRG: 305 | End: 2017-08-18
Attending: PSYCHIATRY & NEUROLOGY
Payer: COMMERCIAL

## 2017-08-18 VITALS
HEIGHT: 69 IN | BODY MASS INDEX: 26.22 KG/M2 | HEART RATE: 75 BPM | RESPIRATION RATE: 16 BRPM | SYSTOLIC BLOOD PRESSURE: 165 MMHG | OXYGEN SATURATION: 99 % | DIASTOLIC BLOOD PRESSURE: 87 MMHG | WEIGHT: 177.03 LBS | TEMPERATURE: 98.4 F

## 2017-08-18 LAB
GLUCOSE BLD STRIP.AUTO-MCNC: 95 MG/DL (ref 65–100)
SERVICE CMNT-IMP: NORMAL

## 2017-08-18 PROCEDURE — 74011250637 HC RX REV CODE- 250/637: Performed by: FAMILY MEDICINE

## 2017-08-18 PROCEDURE — 82962 GLUCOSE BLOOD TEST: CPT

## 2017-08-18 PROCEDURE — 99218 HC RM OBSERVATION: CPT

## 2017-08-18 PROCEDURE — 72141 MRI NECK SPINE W/O DYE: CPT

## 2017-08-18 RX ORDER — HYDRALAZINE HYDROCHLORIDE 10 MG/1
10 TABLET, FILM COATED ORAL
Qty: 30 TAB | Refills: 1 | Status: SHIPPED | OUTPATIENT
Start: 2017-08-18

## 2017-08-18 RX ORDER — HYDRALAZINE HYDROCHLORIDE 10 MG/1
10 TABLET, FILM COATED ORAL
Status: DISCONTINUED | OUTPATIENT
Start: 2017-08-18 | End: 2017-08-18 | Stop reason: HOSPADM

## 2017-08-18 RX ADMIN — ACETAMINOPHEN 650 MG: 325 TABLET, FILM COATED ORAL at 12:52

## 2017-08-18 RX ADMIN — ASPIRIN 81 MG 81 MG: 81 TABLET ORAL at 09:31

## 2017-08-18 RX ADMIN — Medication 10 ML: at 06:00

## 2017-08-18 RX ADMIN — FAMOTIDINE 20 MG: 20 TABLET ORAL at 09:30

## 2017-08-18 RX ADMIN — Medication 10 ML: at 14:00

## 2017-08-18 RX ADMIN — ACETAMINOPHEN 650 MG: 325 TABLET, FILM COATED ORAL at 08:00

## 2017-08-18 NOTE — CONSULTS
[address] [facility] [address]   [address] [facility]       Name:  Marcio Middleton   MR#:  [id number]   :  1970   Account #:  [de-identified]    Date of Consultation:  2017   Date of Adm:  2017       REASON FOR CONSULTATION: Hypertension and tachycardia. HISTORY OF PRESENT ILLNESS: She had been admitted the   previous day having not felt well for a few days. Her story begins   actually several weeks back when she began to notice that she had   some intermittent numbness in her tongue. Subsequently in the last   week she has had some problem with increased pain in her neck   associated with a headache and was started on tramadol. Prior to   admission, she began to feel some tingling in her upper extremities   and on the morning of admission, she woke up and was feeling very   uncomfortable. Her blood pressure was up, her heart rate was up and   she just was not feeling quite right so she presented to the emergency   room. At that time she had no complaints of any chest pain, not really   lightheaded, not short of breath. She was admitted for further   evaluation. The initial thought was that perhaps she had serotonin   syndrome related to the use of tramadol. All of her medications were   discontinued. Those included Robaxin, which was a relatively new   drug, Azulfidine 500 mg 4 per day, black cohosh, Voltaren   p.r.n., tramadol 50 mg every 6 hours as needed and Tylenol as   needed. PAST MEDICAL HISTORY: Primarily notable for some degenerative   disk disease both in her lower back and also her cervical disk. She   also has psoriatic arthritis. She had no prior cardiac history. SOCIAL HISTORY: Lives at home. 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 Currently, she does not use alcohol excessively. Denies tobacco or other drug use. REVIEW OF SYSTEMS: Otherwise unremarkable. She is quite active. She exercises regularly.     PHYSICAL EXAMINATION   VITAL SIGNS: Blood pressure is 140/90. HEENT: Unremarkable. Pupils are equal. Her carotid upstroke is   normal. There are no bruits. No JVD. LUNGS: Completely clear. CARDIAC: Reveals a regular rate and rhythm, normal S1,   physiologically split S2, without murmur, rub or gallop. ABDOMEN: Reveals no bruits or palpable aorta, soft and nontender   without masses. EXTREMITIES: Show 2+ pulses. No edema. NEUROLOGIC: She is completely nonfocal.    DIAGNOSTIC DATA: Her EKG when she presented to the ER shows   ST and T-wave abnormalities, which repeat EKG showed resolution of. LABORATORY DATA: Her lab work demonstrated normal CBC. Her   chemistry was pretty unremarkable except for some hypokalemia and   her troponins were all normal. TSH was normal.     Echocardiogram just read by me demonstrates normal left ventricular   size and function and no significant valvular abnormalities. IMPRESSION: Relatively healthy female with abrupt onset of   hypertension and tachycardia. It may in fact be related to serotonin   syndrome, in which case prompt withdrawal of all her medications   should help restore her blood pressure, heart rate, other symptoms   back to normal. She does have an MRI of her head and neck pending   to further evaluate her cervical disk disease and what role that might   be playing in her headache and other symptoms. For control of her   blood pressure I think p.r.n. hydralazine is reasonable. A beta blocker   would treat both her blood pressure and her heart rate response but   since we are waiting to see if this all clears up on its own, I think the   p.r.n. hydralazine is a reasonable thing to do. The only other metabolic   issue that I could think might cause some kind of abrupt   headache/tachycardia/hypertension syndrome would be a pheo, but   since this is the first and only episode, I think we can wait and see how   this works out.  If she continues to have the problem after several days   would proceed with a 24-hour urine for VMA and metanephrines. In   terms of her abnormal EKG on presentation, this may all be sinus   tachycardia-induced ST changes completely benign. I think it would be   reasonable, given how active she is, to put her through a stress test   once all of these current issues are cleared up and we can arrange   that as an outpatient. I appreciate the opportunity to assist you in her care.         MD Gume Stephens / Eloisa Lewis   D:  08/18/2017   16:44   T:  08/18/2017   17:20   Job #:  [id number]

## 2017-08-18 NOTE — PROGRESS NOTES
Reviewed written and oral discharge instructions with patient. Went over follow up appointments and medication changes. Wheeled out in a wheelchair by volunteers. Pt transported home by family. Pt was in no apparent distress.

## 2017-08-18 NOTE — DISCHARGE INSTRUCTIONS
Discharge Instructions       PATIENT ID: Mora Ortiz  MRN: [id number]   YOB: 1970    DATE OF ADMISSION: 8/16/2017 10:27 AM    DATE OF DISCHARGE: 8/18/2017    PRIMARY CARE PROVIDER: Sherine Albarran MD       DISCHARGING PHYSICIAN: Eusebio Dale NP    To contact this individual call [phone] and ask the  to page. If unavailable ask to be transferred the Adult Hospitalist Department. DISCHARGE DIAGNOSES Tongue numbness and Bilateral Upper Extremity Weakness    CONSULTATIONS: IP CONSULT TO HOSPITALIST  IP CONSULT TO NEUROLOGY  IP CONSULT TO NEUROLOGY  IP CONSULT TO CARDIOLOGY    PROCEDURES/SURGERIES: * No surgery found *    PENDING TEST RESULTS:   At the time of discharge the following test results are still pending: na    FOLLOW UP APPOINTMENTS:   Follow-up Information     Follow up With Details Comments Contact Info    Sherine Albarran MD Schedule an appointment as soon as possible for a visit As needed [facility]   [address] [place] [address]      Jasmeet Penny MD  As needed [facility]  [phone]             ADDITIONAL CARE RECOMMENDATIONS: Follow up with your PCP  Repeat Lipid Panel fasting  Follow up with your blood pressures      DIET: low cholesterol, low fat    ACTIVITY: resume          DISCHARGE MEDICATIONS:   See Medication Reconciliation Form    · It is important that you take the medication exactly as they are prescribed. · Keep your medication in the bottles provided by the pharmacist and keep a list of the medication names, dosages, and times to be taken in your wallet. · Do not take other medications without consulting your doctor. NOTIFY YOUR PHYSICIAN FOR ANY OF THE FOLLOWING:   Fever over 101 degrees for 24 hours. Chest pain, shortness of breath, fever, chills, nausea, vomiting, diarrhea, change in mentation, falling, weakness, bleeding.  Severe pain or pain not relieved by medications. Or, any other signs or symptoms that you may have questions about.       DISPOSITION:    Home With:   OT  PT  HH  RN       SNF/Inpatient Rehab/LTAC   x Independent/assisted living    Hospice    Other:     CDMP Checked:   Yes x       Signed:   Lynne Watkins NP  8/18/2017  8:24 AM

## 2017-08-18 NOTE — DISCHARGE SUMMARY
Discharge Summary       PATIENT ID: Liana Roberts  MRN: [id number]   YOB: 1970    DATE OF ADMISSION: 8/16/2017 10:27 AM    DATE OF DISCHARGE: 8/17/2017  PRIMARY CARE PROVIDER: Darrick Zarate MD       DISCHARGING PHYSICIAN: Torri Crawford NP    To contact this individual call [phone] and ask the  to page. If unavailable ask to be transferred the Adult Hospitalist Department. CONSULTATIONS: IP CONSULT TO HOSPITALIST  IP CONSULT TO NEUROLOGY  IP CONSULT TO NEUROLOGY  IP CONSULT TO CARDIOLOGY    PROCEDURES/SURGERIES: * No surgery found *    ADMITTING [address] [place] [address] COURSE:   Ruthine Apley is a 49year old female who presented to the emergency room after having symptoms of tongue numbness, bilateral upper arm weakness and elevated blood pressure. She has known cervical disc disease and had been seen by Dr. Connie Cameron in 2006, who has since retired. She has undergone radioablation in her lumbar spine with Dr. Daphne Lane in the past. She exercises daily and feels that she is experiencing some dizziness, headache, but denies palpitations or chest pain. She was admitted to further work up her symptoms. She underwent Head and Neck MRI/MRA and Cervical Spine MRI. Neurology was consulted as well as Cardiology. She had resolution of her symptoms while here in the hospital. Cardiology recommends Hydralazine prn systolic greater than 685 or diastolic greater than 447. Neurology recommends outpatient follow up with Neurosurgery for her cervical spine pain. We discussed repeating her lipid panel as fasting. She was discharged in stable condition with outpatient follow up.         DISCHARGE DIAGNOSES / PLAN:      Tongue numbness, Unlikely TIA, concern for Serotonin Syndrome  Hgb A1C 5.7  Elevated LDL and Cholesterol but nonfasting  TSH 1.01   MRI/MRA of Head and Neck, Cervical Spine reviewed and discussed with [facility] with Neurology  Dr. Tyron Penny discussed on 8/17  ASA, Statin  Head and CT Spine: 1.3 mm anterolisthesis of C4 on C5 noted. There is no fracture. The odontoid process is normal. The craniocervical junction is within normal limits. Paraspinal soft tissues are unremarkable. 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     Hypertensive Urgency with tachycardia  Likely due to anxiety  This has resolved this AM  Dr. 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 Nonemergent/outpatient contrast-enhanced MRI of the cervical spine for further  delineation can be obtained.     Otherwise multilevel disc and facet degenerative change with moderate left  foraminal stenoses at C2-3 and C6-7. There is no significant degree of central canal stenosis. Mild right foraminal stenosis at C4-5.     FOLLOW UP APPOINTMENTS:    Follow-up Information     Follow up With Details Comments Contact Info    Darrick Zarate MD Schedule an appointment as soon as possible for a visit As needed [address] [place] [address]   [address] [place] [address] 83. [phone]        ADDITIONAL CARE RECOMMENDATIONS: Follow up with your PCP  Repeat Lipid Panel fasting  Follow up with your blood pressures      DIET: low cholesterol, low fat    ACTIVITY: resume                   DISCHARGE MEDICATIONS:  Current Discharge Medication List      CONTINUE these medications which have NOT CHANGED    Details   methocarbamol (ROBAXIN) 500 mg tablet Take 500 mg by mouth four (4) times daily. sulfaSALAzine (AZULFIDINE) 500 mg tablet Take 500 mg by mouth four (4) times daily.       diclofenac EC (VOLTAREN) 75 mg EC tablet Take 75 mg by mouth two (2) times a day. traMADol (ULTRAM) 50 mg tablet Take 50 mg by mouth every six (6) hours as needed for Pain. cholecalciferol (VITAMIN D3) 1,000 unit tablet Take 1,000 Units by mouth daily. acetaminophen (TYLENOL EXTRA STRENGTH) 500 mg tablet Take 500 mg by mouth every six (6) hours as needed for Pain. STOP taking these medications       BL COH/TEA/GINSG/DIANE/HOP/THEAN (BLACK COHOSH MENOPAUSE COMPLEX PO) Comments:   Reason for Stopping:                 NOTIFY YOUR PHYSICIAN FOR ANY OF THE FOLLOWING:   Fever over 101 degrees for 24 hours. Chest pain, shortness of breath, fever, chills, nausea, vomiting, diarrhea, change in mentation, falling, weakness, bleeding. Severe pain or pain not relieved by medications. Or, any other signs or symptoms that you may have questions about. DISPOSITION:    Home With:   OT  PT  HH  RN       Long term SNF/Inpatient Rehab   x Independent/assisted living    Hospice    Other:       PATIENT CONDITION AT DISCHARGE:     Functional status    Poor     Deconditioned    x Independent      Cognition    x Lucid     Forgetful     Dementia      Catheters/lines (plus indication)    Quiñones     PICC     PEG    x None      Code status    x Full code     DNR      PHYSICAL EXAMINATION AT DISCHARGE:  Constitutional:  No acute distress, cooperative, pleasant    ENT:  Oral mucous moist, oropharynx benign. Neck supple,    Resp:  CTA bilaterally. No wheezing/rhonchi/rales. No accessory muscle use   CV:  Regular rhythm, normal rate, no murmurs, gallops, rubs    GI:  Soft, non distended, non tender. normoactive bowel sounds, no hepatosplenomegaly     Musculoskeletal:  No edema, warm, 2+ pulses throughout    Neurologic:  Moves all extremities. AAOx3, CN II-XII reviewed, no facial droop , face is symmetrical, tongue symmetrical, EOMS intact                                             Psych:  Good insight, Not anxious nor agitated.   Skin:  Good turgor, no rashes or ulcers        Visit Vitals    BP (!) 141/99    Pulse 75    Temp 98.2 °F (36.8 °C)    Resp 14    Ht 5' 9\" (1.753 m)    Wt 80.3 kg (177 lb 0.5 oz)    SpO2 99%    BMI 26.14 kg/m2           CHRONIC MEDICAL DIAGNOSES:  Problem List as of 8/18/2017  Date Reviewed: 8/18/2017          Codes Class Noted - Resolved    * (Principal)Weakness ICD-10-CM: R53.1  ICD-9-CM: 780.79  8/16/2017 - Present              Greater than 30 minutes were spent with the patient on counseling and coordination of care    Signed:   Omar Love NP  8/18/2017  8:27 AM

## 2017-08-18 NOTE — PROGRESS NOTES
Cardiology Progress Note    Pt feels better  BP and HR better overnight (sleeping)  No hydralazine for last 24 hours    Imp:  Elevated BP and HR-?serotonin syndrome vs pain? Headache  Abnormal EKG  Tongue numbness  Cervical disc disease  Psoriatic arthritis    Rec: If discharged, would send her home on prn hydralazine, with threshold to take >150/. Can take 3-4 times per day if needed. Evelyn Nevarez MD        She will follow up with me for med adjustment if needed and also when able, a stress echo.

## 2017-08-18 NOTE — PROGRESS NOTES
Bedside, Verbal and Written shift change report given to Ifeanyi Tran (oncoming nurse) by Toy Hernandez (offgoing nurse). Report included the following information SBAR, Kardex, Intake/Output, MAR and Recent Results   Toy Hernandez.

## 2017-09-18 ENCOUNTER — HOSPITAL ENCOUNTER (OUTPATIENT)
Dept: MRI IMAGING | Age: 47
Discharge: HOME | End: 2017-09-18
Attending: FAMILY MEDICINE
Payer: COMMERCIAL

## 2017-09-18 VITALS — BODY MASS INDEX: 25.1 KG/M2 | WEIGHT: 170 LBS

## 2017-09-18 DIAGNOSIS — M54.12 CERVICAL RADICULOPATHY: ICD-10-CM

## 2017-09-18 DIAGNOSIS — M50.90 CERVICAL DISC DISEASE: ICD-10-CM

## 2017-09-18 PROCEDURE — 74011250636 HC RX REV CODE- 250/636: Performed by: FAMILY MEDICINE

## 2017-09-18 PROCEDURE — A9576 INJ PROHANCE MULTIPACK: HCPCS | Performed by: FAMILY MEDICINE

## 2017-09-18 PROCEDURE — 72156 MRI NECK SPINE W/O & W/DYE: CPT

## 2017-09-18 RX ADMIN — GADOTERIDOL 15 ML: 279.3 INJECTION, SOLUTION INTRAVENOUS at 19:55

## 2017-09-18 NOTE — PHYSICIAN ADVISORY
Dear. Juanis Puga:     I have reviewed the following case on 9/18/2017 :        Pt Name:  Rosario Dc   MR#  [id number]   [facility]#   [id number]   [address] date  8/16/2017 10:27 AM   Discharge date  8/18/2017   Discharging doctor  Mee Ward  No att. providers found   Admitting doctor Darrick Mcdowell   Principal diagnosis  Weakness        Insurance  Payor: Collin Pradhan / Plan: Isis Carl / Product Type: HMO /      Clinicals    49 y.o. female who presented with tongue numbness, She was worked up for CVA which ruled out. She was discharged within a short period with pending MRI of C Spine. No significant interventions took place. Neurology consultation didn't direct towards any specific diagnosis. [address]ie [address]  Neurology [address] [place] [address]   TIA    Decision Accept OBSERVATION status    Additional comments  MRI C spine showed possible syrinx !!          Sincerely    Beverly Mcdonald MD MPH FACP     Physician Advisor  [address]    President Medical Staff, [facility] [facility]      Cell  [phone]

## 2018-07-31 ENCOUNTER — HOSPITAL ENCOUNTER (OUTPATIENT)
Dept: ULTRASOUND IMAGING | Age: 48
Discharge: HOME | End: 2018-07-31
Attending: INTERNAL MEDICINE
Payer: COMMERCIAL

## 2018-07-31 DIAGNOSIS — R10.13 EPIGASTRIC PAIN: ICD-10-CM

## 2018-07-31 DIAGNOSIS — R79.89 ELEVATED LFTS: ICD-10-CM

## 2018-07-31 PROCEDURE — 76700 US EXAM ABDOM COMPLETE: CPT

## 2020-09-02 ENCOUNTER — HOSPITAL ENCOUNTER (OUTPATIENT)
Dept: MRI IMAGING | Age: 50
Discharge: HOME | End: 2020-09-02
Attending: NURSE PRACTITIONER
Payer: COMMERCIAL

## 2020-09-02 DIAGNOSIS — M54.16 LUMBAR RADICULOPATHY: ICD-10-CM

## 2020-09-02 DIAGNOSIS — M47.816 LUMBAR SPONDYLOSIS: ICD-10-CM

## 2020-09-02 PROCEDURE — 72148 MRI LUMBAR SPINE W/O DYE: CPT

## 2022-03-19 PROBLEM — R53.1 WEAKNESS: Status: ACTIVE | Noted: 2017-08-16

## 2022-10-04 ENCOUNTER — OFFICE VISIT (OUTPATIENT)
Dept: SLEEP MEDICINE | Age: 52
End: 2022-10-04
Payer: COMMERCIAL

## 2022-10-04 VITALS
DIASTOLIC BLOOD PRESSURE: 80 MMHG | HEART RATE: 70 BPM | RESPIRATION RATE: 20 BRPM | WEIGHT: 191 LBS | OXYGEN SATURATION: 97 % | SYSTOLIC BLOOD PRESSURE: 128 MMHG | HEIGHT: 68 IN | BODY MASS INDEX: 28.95 KG/M2

## 2022-10-04 DIAGNOSIS — G47.33 OBSTRUCTIVE SLEEP APNEA (ADULT) (PEDIATRIC): Primary | ICD-10-CM

## 2022-10-04 PROCEDURE — 99203 OFFICE O/P NEW LOW 30 MIN: CPT | Performed by: INTERNAL MEDICINE

## 2022-10-04 RX ORDER — FLECAINIDE ACETATE 100 MG/1
100 TABLET ORAL EVERY 12 HOURS
COMMUNITY
Start: 2022-07-20

## 2022-10-04 RX ORDER — ESTRADIOL 0.05 MG/D
PATCH, EXTENDED RELEASE TRANSDERMAL
COMMUNITY
Start: 2022-09-14

## 2022-10-04 NOTE — PROGRESS NOTES
[address] Jose Miguel. [place], [address]  Tel.  [phone]  Fax. [address]  [place], [address] [facility]  Tel.  [phone]  Fax. [phone] [address]  Tel.  [phone]  Fax. [phone]         Subjective:      Emilia Griffith is an 47 y.o. female self-referred for evaluation for a sleep disorder. She complains of fatigue despite healthy eating/sleeping/exercise habits associated with concern that she may have sleep apea. Symptoms began 2 years ago, gradually improving since that time. She usually can fall asleep in less than 30 minutes. Family or house members note snoring, occasional pauses in breathing. She denies knees buckling with laughing, completely or partially paralyzed while falling asleep or waking up (had a couple of episodes in her lifetime, but none recently), denies sleep related hallucinations. Emilia Griffith does wake up frequently at night. She is not bothered by waking up too early and left unable to get back to sleep. She actually sleeps about 8 hours at night and wakes up about 3 times during the night. She does not work shifts:  .   Katy Jay indicates she does not get too little sleep at night. Her bedtime is 1000. She awakens at 0630. She does take naps. She takes 2 naps a week lasting 30 to 45. 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 She has a very busy life but just concerned about her fatigue because still tired/low energy despite maintaining a clean diet and exercise regimen. [place] Sleepiness Score: 11   which reflect mild daytime drowsiness.     No Known Allergies      Current Outpatient Medications:     hydrALAZINE (APRESOLINE) 10 mg tablet, Take 1 Tab by mouth four (4) times daily as needed for Other (BP>150/100). , Disp: 30 Tab, Rfl: 1    methocarbamoL (ROBAXIN) 500 mg tablet, Take 500 mg by mouth four (4) times daily. , Disp: , Rfl:     sulfaSALAzine (AZULFIDINE) 500 mg tablet, Take 500 mg by mouth four (4) times daily. , Disp: , Rfl:     diclofenac EC (VOLTAREN) 75 mg EC tablet, Take 75 mg by mouth two (2) times a day., Disp: , Rfl:     traMADol (ULTRAM) 50 mg tablet, Take 50 mg by mouth every six (6) hours as needed for Pain., Disp: , Rfl:     cholecalciferol (VITAMIN D3) (1000 Units /25 mcg) tablet, Take 1,000 Units by mouth daily. , Disp: , Rfl:     acetaminophen (TYLENOL) 500 mg tablet, Take 500 mg by mouth every six (6) hours as needed for Pain., Disp: , Rfl:     Lyllana 0.05 mg/24 hr, APPLY 1 PATCH TOPICALLY TO THE SKIN 2 TIMES A WEEK, Disp: , Rfl:     flecainide (TAMBOCOR) 100 mg tablet, Take 100 mg by mouth every twelve (12) hours. , Disp: , Rfl:      She  has a past medical history of Arrhythmia and Arthritis. And arrhythmia     She  has a past surgical history that includes hx orthopaedic and hx gyn. She family history includes Hypertension in her father and mother. She  reports that she has never smoked. She has never used smokeless tobacco. She reports current alcohol use. Review of Systems:  Constitutional:  recent 8 pound weight loss (she has had a hard time losing weight)  Eyes:  No blurred vision. CVS:  No significant chest pain, follows with cardiology for frequent PVC's maintained on flecainide for past couple of years.    Pulm:  No significant shortness of breath  GI:  No significant nausea or vomiting  :  + (2-3 times)significant nocturia  Musculoskeletal:  No significant joint pain at night  Skin:  No significant rashes  Neuro:  No significant dizziness   Psych:  No active mood issues    Sleep Review of Systems: notable for no difficulty falling asleep; +frequent awakenings at night;  occasional dreaming noted; no nightmares ; rare early morning headaches; no memory problems; no concentration issues       Objective:   Visit Vitals  /80 (BP 1 Location: Right upper arm, BP Patient Position: Sitting, BP Cuff Size: Adult long)   Pulse 70   Resp 20   Ht 5' 8\" (1.727 m)   Wt 191 lb (86.6 kg)   SpO2 97%   BMI 29.04 kg/m²         General:   Not in acute distress   Eyes:  Anicteric sclerae, no obvious strabismus   Nose:  No obvious nasal septum deviation    Oropharynx:   Class 2 oropharyngeal outlet, thick tongue base,   Tonsils:   tonsils are absent   Neck:    ; midline trachea   Chest/Lungs:  Equal lung expansion, clear on auscultation    CVS:  Normal rate, regular rhythm; no JVD   Skin:  Warm to touch; no obvious rashes   Neuro:  No focal deficits ; no obvious tremor    Psych:  Normal affect,  normal countenance;          Assessment:       ICD-10-CM ICD-9-CM    1. Obstructive sleep apnea (adult) (pediatric)  G47.33 327.23 SLEEP STUDY UNATTENDED, 4 CHANNEL            Plan:     * The patient currently has a Moderate Risk for having sleep apnea. STOP-BANG score 4.  * HSAT was ordered for initial evaluation. Treatment options for sleep apnea were reviewed. she would like to proceed with a trial of PAP if found to have significant apnea. * She was provided information on sleep apnea including coresponding risk factors and the importance of proper treatment. * Counseling was provided regarding proper sleep hygiene and safe driving. She continues on her weight loss and exercise regimen  She will discuss with her cardiologist regarding flecainide side effects    The treatment plan was reviewed with the patient in detail . she understands that the lead technologist will be calling her  with the results or notifying of results via [address] [place] [address] and assisting with the next step in the treatment plan as outlined today during the consultation with me. All of her questions were addressed.      Electronically signed by    Naveed Blackman MD  Diplomate in Sleep Medicine  [facility]  10/4/2022

## 2022-10-04 NOTE — PATIENT INSTRUCTIONS
[address] [facility] [place] Miguel. [place], [address]  Tel.  [phone]  Fax. [address] [facility] [address]  [place], [address] [facility]  Tel.  [phone]  Fax. [phone] [address]  Tel.  [phone]  Fax. [phone]     Sleep Apnea: After Your Visit  Your Care Instructions  Sleep apnea occurs when you frequently stop breathing for 10 seconds or longer during sleep. It can be mild to severe, based on the number of times per hour that you stop breathing or have slowed breathing. Blocked or narrowed airways in your nose, mouth, or throat can cause sleep apnea. Your airway can become blocked when your throat muscles and tongue relax during sleep. Sleep apnea is common, occurring in 1 out of 20 individuals. Individuals having any of the following characteristics should be evaluated and treated right away due to high risk and detrimental consequences from untreated sleep apnea:  Obesity  Congestive Heart failure  Atrial Fibrillation  Uncontrolled Hypertension  Type II Diabetes  Night-time Arrhythmias  Stroke  Pulmonary Hypertension  High-risk Driving Populations (pilots, truck drivers, etc.)  Patients Considering Weight-loss Surgery    How do you know you have sleep apnea? You probably have sleep apnea if you answer 'yes' to 3 or more of the following questions:  S - Have you been told that you Snore? T - Are you often Tired during the day? O - Has anyone Observed you stop breathing while sleeping? P- Do you have (or are being treated for) high blood Pressure? B - Are you obese (Body Mass Index > 35)? A - Is your Age 48years old or older? N - Is your Neck size greater than 16 inches? G - Are you male Gender? A sleep physician can prescribe a breathing device that prevents tissues in the throat from blocking your airway. Or your doctor may recommend using a dental device (oral breathing device) to help keep your airway open.  In some cases, surgery may be needed to remove enlarged tissues in the throat. Follow-up care is a key part of your treatment and safety. Be sure to make and go to all appointments, and call your doctor if you are having problems. It's also a good idea to know your test results and keep a list of the medicines you take. How can you care for yourself at home? Lose weight, if needed. It may reduce the number of times you stop breathing or have slowed breathing. Go to bed at the same time every night. Sleep on your side. It may stop mild apnea. If you tend to roll onto your back, sew a pocket in the back of your paVizury top. Put a tennis ball into the pocket, and stitch the pocket shut. This will help keep you from sleeping on your back. Avoid alcohol and medicines such as sleeping pills and sedatives before bed. Do not smoke. Smoking can make sleep apnea worse. If you need help quitting, talk to your doctor about stop-smoking programs and medicines. These can increase your chances of quitting for good. Prop up the head of your bed 4 to 6 inches by putting bricks under the legs of the bed. Treat breathing problems, such as a stuffy nose, caused by a cold or allergies. Use a continuous positive airway pressure (CPAP) breathing machine if lifestyle changes do not help your apnea and your doctor recommends it. The machine keeps your airway from closing when you sleep. If CPAP does not help you, ask your doctor whether you should try other breathing machines. A bilevel positive airway pressure machine has two types of air pressureâone for breathing in and one for breathing out. Another device raises or lowers air pressure as needed while you breathe. If your nose feels dry or bleeds when using one of these machines, talk with your doctor about increasing moisture in the air. A humidifier may help.   If your nose is runny or stuffy from using a breathing machine, talk with your doctor about using decongestants or a corticosteroid nasal spray.  When should you call for help? Watch closely for changes in your health, and be sure to contact your doctor if:  You still have sleep apnea even though you have made lifestyle changes. You are thinking of trying a device such as CPAP. You are having problems using a CPAP or similar machine. Where can you learn more? Go to [employer]. Enter F579 in the search box to learn more about \"Sleep Apnea: After Your Visit. \"   © 5048-4107 Healthwise, Incorporated. Care instructions adapted under license by [facility] (which disclaims liability or warranty for this information). This care instruction is for use with your licensed healthcare professional. If you have questions about a medical condition or this instruction, always ask your healthcare professional. Toni Green any warranty or liability for your use of this information. PROPER SLEEP HYGIENE    What to avoid  Do not have drinks with caffeine, such as coffee or black tea, for 8 hours before bed. Do not smoke or use other types of tobacco near bedtime. Nicotine is a stimulant and can keep you awake. Avoid drinking alcohol late in the evening, because it can cause you to wake in the middle of the night. Do not eat a big meal close to bedtime. If you are hungry, eat a light snack. Do not drink a lot of water close to bedtime, because the need to urinate may wake you up during the night. Do not read or watch TV in bed. Use the bed only for sleeping and sexual activity. What to try  Go to bed at the same time every night, and wake up at the same time every morning. Do not take naps during the day. Keep your bedroom quiet, dark, and cool. Get regular exercise, but not within 3 to 4 hours of your bedtime. .  Sleep on a comfortable pillow and mattress. If watching the clock makes you anxious, turn it facing away from you so you cannot see the time.   If you worry when you lie down, start a worry book. Well before bedtime, write down your worries, and then set the book and your concerns aside. Try meditation or other relaxation techniques before you go to bed. If you cannot fall asleep, get up and go to another room until you feel sleepy. Do something relaxing. Repeat your bedtime routine before you go to bed again. Make your house quiet and calm about an hour before bedtime. Turn down the lights, turn off the TV, log off the computer, and turn down the volume on music. This can help you relax after a busy day. Drowsy Driving  The [address] [place] [address] Road Traffic Safety Administration cites drowsiness as a causing factor in more than 853,557 police reported crashes annually, resulting in 76,000 injuries and 1,500 deaths. Other surveys suggest 55% of people polled have driven while drowsy in the past year, 23% had fallen asleep but not crashed, 3% crashed, and 2% had and accident due to drowsy driving. Who is at risk? Young Drivers: One study of drowsy driving accidents states that 55% of the drivers were under 25 years. Of those, 75% were male. Shift Workers and Travelers: People who work overnight or travel across time zones frequently are at higher risk of experiencing Circadian Rhythm Disorders. They are trying to work and function when their body is programed to sleep. Sleep Deprived: Lack of sleep has a serious impact on your ability to pay attention or focus on a task. Consistently getting less than the average of 8 hours your body needs creates partial or cumulative sleep deprivation. Untreated Sleep Disorders: Sleep Apnea, Narcolepsy, R.L.S., and other sleep disorders (untreated) prevent a person from getting enough restful sleep. This leads to excessive daytime sleepiness and increases the risk for drowsy driving accidents by up to 7 times. Medications / Alcohol: Even over the counter medications can cause drowsiness.  Medications that impair a drivers attention should have a warning label. Alcohol naturally makes you sleepy and on its own can cause accidents. Combined with excessive drowsiness its effects are amplified. Signs of Drowsy Driving:   * You don't remember driving the last few miles   * You may drift out of your pascale   * You are unable to focus and your thoughts wander   * You may yawn more often than normal   * You have difficulty keeping your eyes open / nodding off   * Missing traffic signs, speeding, or tailgating  Prevention-   Good sleep hygiene, lifestyle and behavioral choices have the most impact on drowsy driving. There is no substitute for sleep and the average person requires 8 hours nightly. If you find yourself driving drowsy, stop and sleep. Consider the sleep hygiene tips provided during your visit as well. Medication Refill Policy: Refills for all medications require 1 week advance notice. Please have your pharmacy fax a refill request. We are unable to fax, or call in \"controled substance\" medications and you will need to pick these prescriptions up from our office. [employer] Activation    Thank you for requesting access to [employer]. Please follow the instructions below to securely access and download your online medical record. [employer] allows you to send messages to your doctor, view your test results, renew your prescriptions, schedule appointments, and more. How Do I Sign Up? In your internet browser, go to [email or web address]. Click on the First Time User? Click Here link in the Sign In box. You will see the New Member Sign Up page. Enter your [employer] Access Code exactly as it appears below. You will not need to use this code after youve completed the sign-up process. If you do not sign up before the expiration date, you must request a new code. [employer] Access Code:  Activation code not generated  Current [employer] Status: Active (This is the date your [employer] access code will )    Enter the last four digits of your Social Security Number (xxxx) and Date of Birth (mm/dd/yyyy) as indicated and click Submit. You will be taken to the next sign-up page. Create a [employer] ID. This will be your [employer] login ID and cannot be changed, so think of one that is secure and easy to remember. Create a [employer] password. You can change your password at any time. Enter your Password Reset Question and Answer. This can be used at a later time if you forget your password. Enter your e-mail address. You will receive e-mail notification when new information is available in [address] Click Sign Up. You can now view and download portions of your medical record. Click the [employer] link to download a portable copy of your medical information. Additional Information    If you have questions, please call [phone]. Remember, [employer] is NOT to be used for urgent needs. For medical emergencies, dial 911.

## 2022-10-05 ENCOUNTER — DOCUMENTATION ONLY (OUTPATIENT)
Dept: SLEEP MEDICINE | Age: 52
End: 2022-10-05

## 2022-12-01 ENCOUNTER — HOSPITAL ENCOUNTER (OUTPATIENT)
Dept: SLEEP MEDICINE | Age: 52
Discharge: HOME | End: 2022-12-01

## 2022-12-09 ENCOUNTER — TELEPHONE (OUTPATIENT)
Dept: SLEEP MEDICINE | Age: 52
End: 2022-12-09

## 2022-12-09 NOTE — TELEPHONE ENCOUNTER
Results of sleep study in [employer]  Lead tech to convey results to patient    Results of HSAT in [employer]    The home sleep apnea test showed AHI - 2.4/.hour. (a positive test shows an AHI of >5/hour) one very brief oxygen desaturation to 87%. Minimal snoring . This correlates with a test that is negative for significant sleep apnea. We had discussed treatment plan at initial consultation. Based on the results of the home sleep apnea test, APAP is not indicated at this time. Most of the mild events occurred when she was sleeping on her back. Minimizing supine sleep would further minimize any respiratory events/snoring. Optimizing sleep habits by keeping bedtime and waketime regular; ensuring sufficient total sleep time; avoiding caffeine after 2 pm; avoid looking at the clock during the night. Ideally, the clock face should be turned away. Maintaining her regular exercise schedule, at least 3 hours before bedtime, is  beneficial to improving sleep quality. .  Watching TV, using laptops, tablets and smartphones in the evening was discouraged. keep the bedroom cool and dark. Pets should not be allowed to sleep in the bed. Repeat HSAT is indicated if symptoms worsen.

## 2023-05-03 ENCOUNTER — TRANSCRIBE ORDER (OUTPATIENT)
Dept: SCHEDULING | Age: 53
End: 2023-05-03

## 2023-05-03 DIAGNOSIS — R10.11 RUQ PAIN: Primary | ICD-10-CM

## 2023-05-04 ENCOUNTER — TRANSCRIBE ORDERS (OUTPATIENT)
Facility: HOSPITAL | Age: 53
End: 2023-05-04

## 2023-05-04 DIAGNOSIS — R10.11 ABDOMINAL PAIN, RIGHT UPPER QUADRANT: Primary | ICD-10-CM

## 2023-05-11 ENCOUNTER — HOSPITAL ENCOUNTER (OUTPATIENT)
Facility: HOSPITAL | Age: 53
Discharge: HOME | End: 2023-05-11
Payer: COMMERCIAL

## 2023-05-11 DIAGNOSIS — R10.11 ABDOMINAL PAIN, RIGHT UPPER QUADRANT: ICD-10-CM

## 2023-05-11 PROCEDURE — 76700 US EXAM ABDOM COMPLETE: CPT
